# Patient Record
Sex: FEMALE | Race: WHITE | NOT HISPANIC OR LATINO | Employment: FULL TIME | URBAN - METROPOLITAN AREA
[De-identification: names, ages, dates, MRNs, and addresses within clinical notes are randomized per-mention and may not be internally consistent; named-entity substitution may affect disease eponyms.]

---

## 2019-03-21 ENCOUNTER — OFFICE VISIT (OUTPATIENT)
Dept: URGENT CARE | Facility: URGENT CARE | Age: 29
End: 2019-03-21
Payer: COMMERCIAL

## 2019-03-21 VITALS
TEMPERATURE: 97.4 F | HEART RATE: 71 BPM | OXYGEN SATURATION: 98 % | SYSTOLIC BLOOD PRESSURE: 122 MMHG | DIASTOLIC BLOOD PRESSURE: 60 MMHG

## 2019-03-21 DIAGNOSIS — R30.0 DYSURIA: ICD-10-CM

## 2019-03-21 DIAGNOSIS — R10.31 ABDOMINAL PAIN, RIGHT LOWER QUADRANT: Primary | ICD-10-CM

## 2019-03-21 LAB
ALBUMIN UR-MCNC: NEGATIVE MG/DL
APPEARANCE UR: CLEAR
BACTERIA #/AREA URNS HPF: ABNORMAL /HPF
BILIRUB UR QL STRIP: NEGATIVE
COLOR UR AUTO: YELLOW
GLUCOSE UR STRIP-MCNC: NEGATIVE MG/DL
HGB UR QL STRIP: NEGATIVE
KETONES UR STRIP-MCNC: NEGATIVE MG/DL
LEUKOCYTE ESTERASE UR QL STRIP: ABNORMAL
NITRATE UR QL: NEGATIVE
NON-SQ EPI CELLS #/AREA URNS LPF: ABNORMAL /LPF
PH UR STRIP: 6 PH (ref 5–7)
RBC #/AREA URNS AUTO: ABNORMAL /HPF
SOURCE: ABNORMAL
SP GR UR STRIP: <=1.005 (ref 1–1.03)
UROBILINOGEN UR STRIP-ACNC: 0.2 EU/DL (ref 0.2–1)
WBC #/AREA URNS AUTO: ABNORMAL /HPF

## 2019-03-21 PROCEDURE — 99203 OFFICE O/P NEW LOW 30 MIN: CPT | Performed by: FAMILY MEDICINE

## 2019-03-21 PROCEDURE — 81001 URINALYSIS AUTO W/SCOPE: CPT | Performed by: PHYSICIAN ASSISTANT

## 2019-03-21 RX ORDER — NORETHINDRONE ACETATE AND ETHINYL ESTRADIOL .02; 1 MG/1; MG/1
TABLET ORAL
Refills: 1 | COMMUNITY
Start: 2019-01-06 | End: 2021-07-29

## 2019-03-21 RX ORDER — SULFAMETHOXAZOLE/TRIMETHOPRIM 800-160 MG
1 TABLET ORAL 2 TIMES DAILY
Qty: 14 TABLET | Refills: 0 | Status: ON HOLD | OUTPATIENT
Start: 2019-03-21 | End: 2021-07-12

## 2019-03-21 RX ORDER — SULFAMETHOXAZOLE/TRIMETHOPRIM 800-160 MG
1 TABLET ORAL 2 TIMES DAILY
Qty: 14 TABLET | Refills: 0 | Status: SHIPPED | OUTPATIENT
Start: 2019-03-21 | End: 2019-03-21

## 2019-03-21 NOTE — PROGRESS NOTES
SUBJECTIVE  HPI:  Kary Lund is a 28 year old female who presents with the CC of abdominal/pelvic pain.    Pain is located in the RLQ area, with radiation to None.  The pain is characterized as sharp, and at worst is a level 8 on a scale of 1-10.  Pain has been present for 1 hour(s) and is slowly improving.  EXACERBATING FACTORS: movement/walking  RELIEVING FACTORS: nothing.  ASSOCIATED SX: bloating and none.    Family History   Problem Relation Age of Onset     Family History Negative Mother      Family History Negative Father        History reviewed. No pertinent past medical history.  Current Outpatient Medications   Medication Sig Dispense Refill     sulfamethoxazole-trimethoprim (BACTRIM DS/SEPTRA DS) 800-160 MG tablet Take 1 tablet by mouth 2 times daily 14 tablet 0     norethindrone-ethinyl estradiol (MICROGESTIN 1/20) 1-20 MG-MCG tablet TK 1 T PO  D  1     Social History     Tobacco Use     Smoking status: Not on file   Substance Use Topics     Alcohol use: Not on file       ROS:  10 point ROS of systems including Constitutional, Eyes, Respiratory, Cardiovascular,  Genitourinary, Integumentary, Muscularskeletal, Psychiatric were all negative except for pertinent positives noted in my HPI           OBJECTIVE:  /60 (BP Location: Right arm, Patient Position: Chair, Cuff Size: Adult Regular)   Pulse 71   Temp 97.4  F (36.3  C) (Tympanic)   SpO2 98%   GENERAL APPEARANCE: healthy, alert and no distress  EYES: EOMI,  PERRL, conjunctiva clear  HENT: ear canals and TM's normal.  Nose and mouth without ulcers, erythema or lesions  NECK: supple, nontender, no lymphadenopathy  RESP: lungs clear to auscultation - no rales, rhonchi or wheezes  CV: regular rates and rhythm, normal S1 S2, no murmur noted  ABDOMEN:  soft, RLQ tender, no HSM or masses and bowel sounds normal, no rigidity noted , no rebound tenderness   SKIN: no suspicious lesions or rashes      D/D   Appendicitis , ovarian cyst , UTI ,  musculoskeletal   ASSESSMENT:  Assessment     Kary was seen today for urgent care and abdominal pain.    Diagnoses and all orders for this visit:    Abdominal pain, right lower quadrant  -     UA reflex to Microscopic and Culture  -     Urine Microscopic    Dysuria  -     Discontinue: sulfamethoxazole-trimethoprim (BACTRIM DS/SEPTRA DS) 800-160 MG tablet; Take 1 tablet by mouth 2 times daily for 7 days  -     sulfamethoxazole-trimethoprim (BACTRIM DS/SEPTRA DS) 800-160 MG tablet; Take 1 tablet by mouth 2 times daily    discussed with pt that as pain is 2 out of 10   She is feeling much better , she can watch the symptoms   If pain gets worse again should follow up in the ER   She understood and agreed with the plan  Reviewed with pt if has any new worsening symptoms such as fever or chills should follow up in ER   She understood and agreed with the plan    Jessica Wiley MD           See Orders in Epic

## 2020-12-21 LAB
HBV SURFACE AG SERPL QL IA: NORMAL
HIV 1+2 AB+HIV1 P24 AG SERPL QL IA: NORMAL
RUBELLA ABY IGG: NORMAL

## 2021-03-30 LAB — GROUP B STREP PCR: NORMAL

## 2021-06-03 LAB — GROUP B STREP PCR: NORMAL

## 2021-07-09 ENCOUNTER — HOSPITAL ENCOUNTER (INPATIENT)
Facility: CLINIC | Age: 31
LOS: 5 days | Discharge: HOME HEALTH | End: 2021-07-14
Attending: ADVANCED PRACTICE MIDWIFE | Admitting: ADVANCED PRACTICE MIDWIFE
Payer: COMMERCIAL

## 2021-07-09 DIAGNOSIS — Z98.891 S/P PRIMARY LOW TRANSVERSE C-SECTION: Primary | ICD-10-CM

## 2021-07-09 LAB
ABO + RH BLD: NORMAL
ABO + RH BLD: NORMAL
BLD GP AB SCN SERPL QL: NORMAL
BLOOD BANK CMNT PATIENT-IMP: NORMAL
LABORATORY COMMENT REPORT: NORMAL
RUPTURE OF FETAL MEMBRANES BY ROM PLUS: NEGATIVE
SARS-COV-2 RNA RESP QL NAA+PROBE: NEGATIVE
SPECIMEN EXP DATE BLD: NORMAL
SPECIMEN SOURCE: NORMAL

## 2021-07-09 PROCEDURE — 86850 RBC ANTIBODY SCREEN: CPT | Performed by: ADVANCED PRACTICE MIDWIFE

## 2021-07-09 PROCEDURE — 87635 SARS-COV-2 COVID-19 AMP PRB: CPT | Performed by: ADVANCED PRACTICE MIDWIFE

## 2021-07-09 PROCEDURE — 250N000009 HC RX 250: Performed by: ADVANCED PRACTICE MIDWIFE

## 2021-07-09 PROCEDURE — 120N000002 HC R&B MED SURG/OB UMMC

## 2021-07-09 PROCEDURE — 84112 EVAL AMNIOTIC FLUID PROTEIN: CPT | Performed by: ADVANCED PRACTICE MIDWIFE

## 2021-07-09 PROCEDURE — 258N000003 HC RX IP 258 OP 636: Performed by: ADVANCED PRACTICE MIDWIFE

## 2021-07-09 PROCEDURE — 86780 TREPONEMA PALLIDUM: CPT | Performed by: ADVANCED PRACTICE MIDWIFE

## 2021-07-09 PROCEDURE — 86900 BLOOD TYPING SEROLOGIC ABO: CPT | Performed by: ADVANCED PRACTICE MIDWIFE

## 2021-07-09 PROCEDURE — 36415 COLL VENOUS BLD VENIPUNCTURE: CPT | Performed by: ADVANCED PRACTICE MIDWIFE

## 2021-07-09 PROCEDURE — 86901 BLOOD TYPING SEROLOGIC RH(D): CPT | Performed by: ADVANCED PRACTICE MIDWIFE

## 2021-07-09 RX ORDER — OXYTOCIN/0.9 % SODIUM CHLORIDE 30/500 ML
100-340 PLASTIC BAG, INJECTION (ML) INTRAVENOUS CONTINUOUS PRN
Status: COMPLETED | OUTPATIENT
Start: 2021-07-09 | End: 2021-07-12

## 2021-07-09 RX ORDER — OXYCODONE AND ACETAMINOPHEN 5; 325 MG/1; MG/1
1 TABLET ORAL
Status: DISCONTINUED | OUTPATIENT
Start: 2021-07-09 | End: 2021-07-12

## 2021-07-09 RX ORDER — NALOXONE HYDROCHLORIDE 0.4 MG/ML
0.2 INJECTION, SOLUTION INTRAMUSCULAR; INTRAVENOUS; SUBCUTANEOUS
Status: DISCONTINUED | OUTPATIENT
Start: 2021-07-09 | End: 2021-07-12

## 2021-07-09 RX ORDER — IBUPROFEN 800 MG/1
800 TABLET, FILM COATED ORAL
Status: DISCONTINUED | OUTPATIENT
Start: 2021-07-09 | End: 2021-07-12

## 2021-07-09 RX ORDER — SODIUM CHLORIDE, SODIUM LACTATE, POTASSIUM CHLORIDE, CALCIUM CHLORIDE 600; 310; 30; 20 MG/100ML; MG/100ML; MG/100ML; MG/100ML
INJECTION, SOLUTION INTRAVENOUS CONTINUOUS
Status: DISCONTINUED | OUTPATIENT
Start: 2021-07-09 | End: 2021-07-11

## 2021-07-09 RX ORDER — NALOXONE HYDROCHLORIDE 0.4 MG/ML
0.4 INJECTION, SOLUTION INTRAMUSCULAR; INTRAVENOUS; SUBCUTANEOUS
Status: DISCONTINUED | OUTPATIENT
Start: 2021-07-09 | End: 2021-07-12

## 2021-07-09 RX ORDER — ACETAMINOPHEN 325 MG/1
650 TABLET ORAL EVERY 4 HOURS PRN
Status: DISCONTINUED | OUTPATIENT
Start: 2021-07-09 | End: 2021-07-12

## 2021-07-09 RX ORDER — SODIUM CHLORIDE, SODIUM LACTATE, POTASSIUM CHLORIDE, CALCIUM CHLORIDE 600; 310; 30; 20 MG/100ML; MG/100ML; MG/100ML; MG/100ML
INJECTION, SOLUTION INTRAVENOUS CONTINUOUS
Status: DISCONTINUED | OUTPATIENT
Start: 2021-07-09 | End: 2021-07-12

## 2021-07-09 RX ORDER — OXYTOCIN/0.9 % SODIUM CHLORIDE 30/500 ML
1-24 PLASTIC BAG, INJECTION (ML) INTRAVENOUS CONTINUOUS
Status: DISCONTINUED | OUTPATIENT
Start: 2021-07-09 | End: 2021-07-12

## 2021-07-09 RX ORDER — MORPHINE SULFATE 10 MG/ML
10 INJECTION, SOLUTION INTRAMUSCULAR; INTRAVENOUS
Status: DISCONTINUED | OUTPATIENT
Start: 2021-07-09 | End: 2021-07-11

## 2021-07-09 RX ORDER — TRANEXAMIC ACID 10 MG/ML
1 INJECTION, SOLUTION INTRAVENOUS EVERY 30 MIN PRN
Status: DISCONTINUED | OUTPATIENT
Start: 2021-07-09 | End: 2021-07-12

## 2021-07-09 RX ORDER — OXYTOCIN 10 [USP'U]/ML
10 INJECTION, SOLUTION INTRAMUSCULAR; INTRAVENOUS
Status: DISCONTINUED | OUTPATIENT
Start: 2021-07-09 | End: 2021-07-12

## 2021-07-09 RX ORDER — METHYLERGONOVINE MALEATE 0.2 MG/ML
200 INJECTION INTRAVENOUS
Status: DISCONTINUED | OUTPATIENT
Start: 2021-07-09 | End: 2021-07-12

## 2021-07-09 RX ORDER — LIDOCAINE 40 MG/G
CREAM TOPICAL
Status: DISCONTINUED | OUTPATIENT
Start: 2021-07-09 | End: 2021-07-12

## 2021-07-09 RX ORDER — FENTANYL CITRATE 50 UG/ML
50-100 INJECTION, SOLUTION INTRAMUSCULAR; INTRAVENOUS
Status: DISCONTINUED | OUTPATIENT
Start: 2021-07-09 | End: 2021-07-12

## 2021-07-09 RX ORDER — ONDANSETRON 2 MG/ML
4 INJECTION INTRAMUSCULAR; INTRAVENOUS EVERY 6 HOURS PRN
Status: DISCONTINUED | OUTPATIENT
Start: 2021-07-09 | End: 2021-07-12

## 2021-07-09 RX ORDER — HYDROXYZINE HYDROCHLORIDE 25 MG/1
100 TABLET, FILM COATED ORAL
Status: DISCONTINUED | OUTPATIENT
Start: 2021-07-09 | End: 2021-07-12

## 2021-07-09 RX ORDER — CARBOPROST TROMETHAMINE 250 UG/ML
250 INJECTION, SOLUTION INTRAMUSCULAR
Status: DISCONTINUED | OUTPATIENT
Start: 2021-07-09 | End: 2021-07-12

## 2021-07-09 RX ADMIN — SODIUM CHLORIDE, POTASSIUM CHLORIDE, SODIUM LACTATE AND CALCIUM CHLORIDE: 600; 310; 30; 20 INJECTION, SOLUTION INTRAVENOUS at 18:10

## 2021-07-09 RX ADMIN — OXYTOCIN-SODIUM CHLORIDE 0.9% IV SOLN 30 UNIT/500ML 1 MILLI-UNITS/MIN: 30-0.9/5 SOLUTION at 18:10

## 2021-07-09 ASSESSMENT — MIFFLIN-ST. JEOR: SCORE: 1633.95

## 2021-07-09 NOTE — PLAN OF CARE
Data: Patient admitted to room 479 at 1335 Patient is a . Prenatal record reviewed.   OB History    Para Term  AB Living   1 0 0 0 0 0   SAB TAB Ectopic Multiple Live Births   0 0 0 0 0      # Outcome Date GA Lbr Clive/2nd Weight Sex Delivery Anes PTL Lv   1 Current            .  Medical History: History reviewed. No pertinent past medical history..  Gestational age Unknown. Vital signs per doc flowsheet. Fetal movement present. Patient reports Induction Of Labor   as reason for admission. Support persons is present.  Action: Report from Lindsay HERNANDEZ obtained at 1330. Care of patient assumed at 1335. Verbal consent for EFM, external fetal monitors applied. Admission assessment completed. Patient and support persons educated on labor process. Patient instructed to report change in fetal movement, contractions, vaginal leaking of fluid or bleeding, abdominal pain, or any concerns related to the pregnancy to her nurse/physician. Patient oriented to room, call light in reach.   Response: CNM Tigist Page informed of patient arrival.Plan per provider is as ordered. Patient verbalized understanding of education and verbalized agreement with plan. Patient coping with labor via family support.

## 2021-07-09 NOTE — H&P
ADMIT NOTE  =================  Unknown    Kary Lund is a 30 year old female with an No LMP recorded. Patient is pregnant. and Estimated Date of Delivery: Data Unavailable [facility] at 4:33 PM with for induction of labor.  Indication: BPP , oligo today.     HPI  ================    Contractions- mild  Fetal movement- active  ROM- no   Vaginal bleeding- spotting  GBS- negative  FOB- is involved, Robert  Other labor support-     Weight gain-  -  lbs, Total weight gain-  lbs  Height-   BMI-   First prenatal visit at  weeks, Total visits-     PROBLEM LIST  =================  Patient Active Problem List    Diagnosis Date Noted     Indication for care in labor or delivery 2021     Priority: Medium       HISTORIES  ============  Allergies   Allergen Reactions     Clindamycin/Lincomycin Rash     Amoxicillin      History reviewed. No pertinent past medical history.  History reviewed. No pertinent surgical history..  Family History   Problem Relation Age of Onset     Family History Negative Mother      Family History Negative Father      Social History     Tobacco Use     Smoking status: Never Smoker     Smokeless tobacco: Never Used   Substance Use Topics     Alcohol use: Not Currently     OB History    Para Term  AB Living   1 0 0 0 0 0   SAB TAB Ectopic Multiple Live Births   0 0 0 0 0      # Outcome Date GA Lbr Clive/2nd Weight Sex Delivery Anes PTL Lv   1 Current                 LABS:   ===========  Prenatal Labs:  Rhogam not indicated No results found for: ABO, RH, AS, HEPBANG, TREPAB, RUBELLAABIGG, HGB, HIV  Rubella immune  No results found for: GBS  Other labs:  No results found for this or any previous visit (from the past 24 hour(s)).    ROS  =========  Pt denies significant respiratory, cardiovacular, GI, or muscular/skeletalcomplaints.    See RN data base ROS.       PHYSICAL EXAM:  ===============  Temp 98.3  F (36.8  C) (Oral)   Ht 1.626 m (5'  "4\")   Wt 93.9 kg (207 lb)   Breastfeeding No   BMI 35.53 kg/m    General appearance: comfortable  GENERAL APPEARANCE: healthy, alert and no distress  RESP: lungs clear to auscultation - no rales, rhonchi or wheezes  CV: regular rates and rhythm, normal S1 S2, no S3 or S4 and no murmur,and no varicosities  ABDOMEN:  soft, nontender, no epigastric pain  SKIN: no suspicious lesions or rashes  NEURO: Denies headache, blurred vision, other vision changes  PSYCH: mentation appears normal. and affect normal/bright  Legs: Reflexes normal bilaterally     Abdomen: gravid, vertex fetus per Leopold's, non-tender between contractions.   Cephalic presentation confirmed by BSUS  EFW-  8 lbs.   CONTACTIONS: every 6 minutes  FETAL HEART TONES: continuous EFM- baseline 135 with moderate variability and positive accelerations. No decelerations.  PELVIC EXAM: 2/ 70%/ Mid/ soft/ 0   BULLOCK SCORE: 8  BLOODY SHOW: yes small amount   ROM:no  FLUID: none  AMNISURE: negative  Negative pool and fern slide  ASSESSMENT:  ==============  IUP @ Unknown admitted for induction of labor.  Indication: postdates and oligo   NST REACTIVE  Fetal Heart Rate - category one  GBS- negative    Patient Active Problem List   Diagnosis     Indication for care in labor or delivery        PLAN:  ===========  Admit - see IP orders  Labor induction with Pitocin reviewed with pt. Agreeable to plan  Ambulation, hydration, position changes, birthing ball and tub options to facilitate labor reviewed with pt .  Tigist De Jesus, APRN CNM      "

## 2021-07-10 PROBLEM — O41.03X0 OLIGOHYDRAMNIOS IN THIRD TRIMESTER: Status: ACTIVE | Noted: 2021-07-10

## 2021-07-10 LAB — T PALLIDUM AB SER QL: NONREACTIVE

## 2021-07-10 PROCEDURE — 258N000003 HC RX IP 258 OP 636: Performed by: ADVANCED PRACTICE MIDWIFE

## 2021-07-10 PROCEDURE — 250N000013 HC RX MED GY IP 250 OP 250 PS 637: Performed by: ADVANCED PRACTICE MIDWIFE

## 2021-07-10 PROCEDURE — 250N000011 HC RX IP 250 OP 636: Performed by: ADVANCED PRACTICE MIDWIFE

## 2021-07-10 PROCEDURE — 120N000002 HC R&B MED SURG/OB UMMC

## 2021-07-10 RX ORDER — ACETAMINOPHEN 325 MG/1
650 TABLET ORAL ONCE
Status: COMPLETED | OUTPATIENT
Start: 2021-07-10 | End: 2021-07-10

## 2021-07-10 RX ORDER — HYDROXYZINE HYDROCHLORIDE 50 MG/1
100 TABLET, FILM COATED ORAL
Status: DISCONTINUED | OUTPATIENT
Start: 2021-07-10 | End: 2021-07-11

## 2021-07-10 RX ORDER — MORPHINE SULFATE 10 MG/ML
10 INJECTION, SOLUTION INTRAMUSCULAR; INTRAVENOUS
Status: DISCONTINUED | OUTPATIENT
Start: 2021-07-10 | End: 2021-07-12

## 2021-07-10 RX ORDER — MISOPROSTOL 200 UG/1
TABLET ORAL
Status: DISCONTINUED
Start: 2021-07-10 | End: 2021-07-10 | Stop reason: HOSPADM

## 2021-07-10 RX ADMIN — MORPHINE SULFATE 10 MG: 10 INJECTION INTRAVENOUS at 23:51

## 2021-07-10 RX ADMIN — SODIUM CHLORIDE, POTASSIUM CHLORIDE, SODIUM LACTATE AND CALCIUM CHLORIDE: 600; 310; 30; 20 INJECTION, SOLUTION INTRAVENOUS at 01:54

## 2021-07-10 RX ADMIN — SODIUM CHLORIDE, POTASSIUM CHLORIDE, SODIUM LACTATE AND CALCIUM CHLORIDE: 600; 310; 30; 20 INJECTION, SOLUTION INTRAVENOUS at 09:56

## 2021-07-10 RX ADMIN — HYDROXYZINE HYDROCHLORIDE 100 MG: 50 TABLET, FILM COATED ORAL at 23:45

## 2021-07-10 RX ADMIN — ACETAMINOPHEN 650 MG: 325 TABLET, FILM COATED ORAL at 05:51

## 2021-07-10 NOTE — PLAN OF CARE
Care assumed by this writer [id number]. Seema is comfortable and chatty, watching TV with partner.  States contractions are like menstrual cramps, talking through.  Discussed labor preferences/expectations. Plan to use position changes, breathing, and rhythmic movements to cope. Wants to avoid epidural anesthesia.  Contractions 1-4 minutes apart and coupling, some are stronger than others.  FHR baseline 140, moderate variability with accelerations.   Declines morphine and vistaril for sleep at this time.     Plan to continue titration of Pitocin per protocol, encourage alternation of rest and upright positioning and provide bedside labor support.

## 2021-07-10 NOTE — PROVIDER NOTIFICATION
07/10/21 0945   Provider Notification   Provider Name/Title Tigist De Jesus, DELICIA, MARY & Chichi (SNM)   Method of Notification At Bedside   Request Evaluate in Person   Notification Reason Status Update;SVE   Providers here to meet patient, review EFM strip and check cervix. 3/90/0. Providers recommend AROM. Patient and spouse are hesitant and want to think about it for now. EFM strip normal baseline with moderate variability.

## 2021-07-10 NOTE — PLAN OF CARE
Seema is coping well, noticing she cannot lie down through contractions anymore.  Bloody show present.  Sitting on edge of bed, rocking and using hot packs. Appears relaxed between contractions.  Offered continuous bedside support, family declines at this time. They feel they are doing well on their own and know they can call with questions or for support at any time.

## 2021-07-10 NOTE — PROGRESS NOTES
"Subjective:  Kary is comfortably ambulating around room and breathing through contractions. Reports feeling well overall with increased pelvic pressure with contractions.    Objective:  Blood pressure 114/66, pulse 70, temperature 98  F (36.7  C), temperature source Oral, resp. rate 16, height 1.626 m (5' 4\"), weight 93.9 kg (207 lb), last menstrual period 09/25/2020, not currently breastfeeding.  Patient Vitals for the past 24 hrs:   BP Temp Temp src Pulse Resp Height Weight   07/10/21 0630 114/66 98  F (36.7  C) Oral -- 16 -- --   07/10/21 0440 111/68 98.5  F (36.9  C) Oral -- 16 -- --   07/10/21 0232 117/60 97.9  F (36.6  C) Oral -- 16 -- --   07/10/21 0024 107/59 98  F (36.7  C) Oral -- 16 -- --   07/09/21 2330 -- -- -- 70 16 -- --   07/09/21 2145 120/70 -- -- 76 -- -- --   07/09/21 2045 102/51 -- -- 71 -- -- --   07/09/21 1810 108/64 -- -- 76 -- -- --   07/09/21 1350 -- 98.3  F (36.8  C) Oral -- -- 1.626 m (5' 4\") 93.9 kg (207 lb)     General appearance: uncomfortable with contractions  CONTACTIONS: every 1-4 minutes  Pitocin- 8 mu/min.,  Antibiotics- none  FETAL HEART TONES: continuous EFM- baseline 125 with moderate variability and positive accelerations. No decelerations.  ROM: not ruptured  PELVIC EXAM:3/ 90%/ Mid/ soft/ 0   # Pain Assessment:  Current Pain Score 7/10/2021   Patient currently in pain? yes   - Kary is experiencing pain due to labor. Pain management was discussed with Kary and her spouse and the plan was created in a collaborative fashion.  Kary's response to the current recommendations: engaged  - Non-pharmacologic adjuvants:  movement, positioning, breathing techniques      ASSESSMENT:  ==============  IUP @ 41w1d for induction of labor.  Indication: Oligohydramnios   Fetal Heart Rate Tracing category one  GBS- negative  Patient Active Problem List   Diagnosis    Indication for care in labor or delivery    Oligohydramnios in third trimester "     PLAN:  ===========  Ambulation, hydration, position changes, birthing ball/sling and tub options to facilitate labor.  Reevaluate in 1-2 hours prn  Continue labor induction with Pitocin   AROM offered. Pt prefers to discuss with  prior to decision.    I, MADISON Camp, am serving as a scribe; to document services personally performed by  Tigist De Jesus CNM based on data collection and the provider's statements to me.     MADISON Camp  I agree with the PFSH and ROS as completed by the student, except for changes made by me. The remainder of the encounter was performed by me and scribed by the student. The scribed note accurately reflects my personal services and decisions made by me.  Tigist De Jesus, ADRIANE, EZEKIELM, APRN

## 2021-07-10 NOTE — PROGRESS NOTES
"CNM PROGRESS NOTE    SUBJECTIVE:  Patient sitting in bed, comfortable. Partner is bedside and supportive. Reports feeling occasional mild cramps.     OBJECTIVE:  Temp 98.3  F (36.8  C) (Oral)   Ht 1.626 m (5' 4\")   Wt 93.9 kg (207 lb)   LMP 09/25/2020 (LMP Unknown)   Breastfeeding No   BMI 35.53 kg/m      Fetal heart tones: Baseline 135   Variability: moderate  Accelerations: present  Decelerations: absent    Contractions: Pt is guerita every 3-5 minutes, lasting 60-90 seconds   Cervix: deferred  ROM: not ruptured    Pitocin- 2 mu/min.  Antibiotics- none  Cervical ripening: N/A    ASSESSMENT:  IUP @ Unknown IOL for oligohydramnios, later term gestation, and BPP 4/8  GBS- negative  Covid neg    PLAN:   Will continue with pitocin and titrate as able. Reviewed ROM may occur spontaneously or recommendation of AROM with a future SVE.   Reviewed options of morphine and vistaril for therapeutic sleep. Will order medications PRN and patient can ask RN for administration as desired.   reevaluate in 2-4 hours/PRN    Sneha Nelson CNM      "

## 2021-07-10 NOTE — PLAN OF CARE
Ambulating in room with partner, using birth-ball and different labor positions, coping well with labor. See flow sheet for FHR and contraction pattern.  1:1 RN care provided .Will continue to monitor and will notify provider if there is a change in status. Anticipate .

## 2021-07-10 NOTE — PROGRESS NOTES
"Subjective:  Kary is resting between contractions and laboring on cub. Reports increased sensation with contractions. Breathing through contractions. Planning to eat lunch soon.    Objective:  Blood pressure 116/62, pulse 77, temperature 98.3  F (36.8  C), temperature source Oral, resp. rate 16, height 1.626 m (5' 4\"), weight 93.9 kg (207 lb), last menstrual period 09/25/2020, not currently breastfeeding.    Patient Vitals for the past 24 hrs:   BP Temp Temp src Pulse Resp   07/10/21 1310 116/62 98.3  F (36.8  C) Oral -- 16   07/10/21 1110 116/66 98.5  F (36.9  C) Oral 77 16   07/10/21 0835 115/71 98.2  F (36.8  C) Oral -- 16   07/10/21 0630 114/66 98  F (36.7  C) Oral -- 16   07/10/21 0440 111/68 98.5  F (36.9  C) Oral -- 16   07/10/21 0232 117/60 97.9  F (36.6  C) Oral -- 16   07/10/21 0024 107/59 98  F (36.7  C) Oral -- 16   07/09/21 2330 -- -- -- 70 16   07/09/21 2145 120/70 -- -- 76 --   07/09/21 2045 102/51 -- -- 71 --   07/09/21 1810 108/64 -- -- 76 --     General appearance: uncomfortable with contractions  CONTACTIONS: every 1-4 minutes  Pitocin- 16 mu/min.,  Antibiotics- none  FETAL HEART TONES: continuous EFM- baseline 120 with moderate variability and positive accelerations. No decelerations.  ROM: not ruptured  PELVIC EXAM:deferred  # Pain Assessment:  Current Pain Score 7/10/2021   Patient currently in pain? yes   - Kary is experiencing pain due to labor. Pain management was discussed with Kary and her spouse and the plan was created in a collaborative fashion.  Kary's response to the current recommendations: engaged  - Non-pharmacologic adjuvants: Meditation and positioning      ASSESSMENT:  ==============  IUP @ 41w1d in early labor and for induction of labor.  Indication: Oligohydramnios   Fetal Heart Rate Tracing category one  GBS- negative  Membranes intact    Patient Active Problem List   Diagnosis    Indication for care in labor or delivery    Oligohydramnios in third trimester "     PLAN:  ===========  Ambulation, hydration, position changes, birthing ball/sling and tub options to facilitate labor.  Discussed risk/benefits of AROM vs. [place] vs. Observatoin. Pt electing for observation given increasing intensity of contractions.   Reevaluate in 1-2 hours prn  Continue labor induction with Pitocin     I, MADISON Camp, am serving as a scribe; to document services personally performed by  Tigist De Jesus CNM based on data collection and the provider's statements to me.     MADISON Camp  I agree with the PFSH and ROS as completed by the student, except for changes made by me. The remainder of the encounter was performed by me and scribed by the student. The scribed note accurately reflects my personal services and decisions made by me.  Tigist De Jesus, ADRIANE, MARY, APRN

## 2021-07-10 NOTE — PLAN OF CARE
VSS, afebrile. Intact.  Coping well with contractions, sitting/walking, swaying, breathing well, using birth ball. Inwardly focused. States she likely will not want to be touched as things progress, likes uninterrupted focus.  Tylenol given for mild headache this morning.  Pitocin @ 8 mU/hr. 3-5 ctx in 10 minutes.   Last cervical exam @ 1530 2/70%/0, declined exam this morning.   FHR baseline 120, moderate variability with accelerations.      Continue plan of care

## 2021-07-10 NOTE — PROGRESS NOTES
"Subjective:  Kary remains active while laboring. Sitting on birthing ball and leaning against bed. Requesting leopolds maneuvers prior to deciding on AROM due to concern for fetal positioning per guidance of anjana. Discussed that this does not provide conclusive evidence of fetal head positioning, pt expressing understanding.     Objective:  Blood pressure 116/66, pulse 77, temperature 98.5  F (36.9  C), temperature source Oral, resp. rate 16, height 1.626 m (5' 4\"), weight 93.9 kg (207 lb), last menstrual period 09/25/2020, not currently breastfeeding.  Patient Vitals for the past 24 hrs:   BP Temp Temp src Pulse Resp Height Weight   07/10/21 1110 116/66 98.5  F (36.9  C) Oral 77 16 -- --   07/10/21 0835 115/71 98.2  F (36.8  C) Oral -- 16 -- --   07/10/21 0630 114/66 98  F (36.7  C) Oral -- 16 -- --   07/10/21 0440 111/68 98.5  F (36.9  C) Oral -- 16 -- --   07/10/21 0232 117/60 97.9  F (36.6  C) Oral -- 16 -- --   07/10/21 0024 107/59 98  F (36.7  C) Oral -- 16 -- --   07/09/21 2330 -- -- -- 70 16 -- --   07/09/21 2145 120/70 -- -- 76 -- -- --   07/09/21 2045 102/51 -- -- 71 -- -- --   07/09/21 1810 108/64 -- -- 76 -- -- --   07/09/21 1350 -- 98.3  F (36.8  C) Oral -- -- 1.626 m (5' 4\") 93.9 kg (207 lb)     General appearance: uncomfortable with contractions  CONTACTIONS: every 2-5 minutes  Pitocin- 11 mu/min.,  Antibiotics- none  FETAL HEART TONES: continuous EFM- baseline 125 with moderate variability and positive accelerations. No decelerations.  ROM: not ruptured per patient request  PELVIC EXAM:deferred    # Pain Assessment:  Current Pain Score 7/10/2021   Patient currently in pain? yes   - Kary is experiencing pain due to labor. Pain management was discussed with Kary and her spouse and the plan was created in a collaborative fashion.  Kary's response to the current recommendations: engaged  - Non-pharmacologic adjuvants: Meditation and movement      ASSESSMENT:  ==============  IUP @ 41w1d " in early labor and for induction of labor.  Indication: Oligohydramnios   Fetal Heart Rate Tracing category one  GBS- negative  Fetus in right longitudinal position.    Patient Active Problem List   Diagnosis    Indication for care in labor or delivery    Oligohydramnios in third trimester     PLAN:  ===========  Ambulation, hydration, position changes, birthing ball/sling and tub options to facilitate labor.  Pt would like to continue to consider AROM vs. ulloa bulb vs. Observatoin.   Labor induction with Pitocin reviewed with pt. Agreeable to plan.   Reevaluate in 1-2 hours prn    I, MADISON Camp, am serving as a scribe; to document services personally performed by  Tigist De Jesus CNM based on data collection and the provider's statements to me.     MADISON Camp  I agree with the PFSH and ROS as completed by the student, except for changes made by me. The remainder of the encounter was performed by me and scribed by the student. The scribed note accurately reflects my personal services and decisions made by me.  Tigist De Jesus, ADRIANE, EZEKIELM, APRN

## 2021-07-10 NOTE — PROGRESS NOTES
"CNM PROGRESS NOTE    SUBJECTIVE:  Stopped in to check on patient. She is awake, but resting in bed between contractions. Reports feeling a change in pain when pitocin went from 5-6mu. She did get about 2 hours of sleep earlier, but states she cannot sleep through contractions now. Endorses bloody show and mucous. States she is coping well. Does note she has a mild headache. No vision changes or RUQ pain. Robert is bedside and supportive. Agreeable to plan of care discussion    OBJECTIVE:  /68   Pulse 70   Temp 98.5  F (36.9  C) (Oral)   Resp 16   Ht 1.626 m (5' 4\")   Wt 93.9 kg (207 lb)   LMP 09/25/2020   Breastfeeding No   BMI 35.53 kg/m      Fetal heart tones: Baseline 120   Variability: moderate  Accelerations: present  Decelerations: absent    Contractions: Pt is guerita every 2-4 minutes, lasting 60-80 seconds   Cervix: deferred  ROM: not ruptured    Pitocin- 7 mu/min.  Antibiotics- none  Cervical ripening: N/A    ASSESSMENT:  IUP @ 41w1d IOL for oligohydramnios; late term gestation; BPP 4/8  GBS- negative  Covid negative     PLAN:   Reviewed pitocin has been running for ~12 hours and if patient desires, SVE could be performed. Discussed that perception of increased pain and bloody show also indicate that pitocin is likely working to advance labor. Patient wishes to defer SVE at this time. Discussed that if not feeling more uncomfortable in 4 hours and pitocin is unable to titrate higher, would recommend SVE to assess IOL progress. Patient is agreeable.  Tylenol for headache.  reevaluate in 2-4 hours/PRN    Sneha Nelson CNM      "

## 2021-07-10 NOTE — PROVIDER NOTIFICATION
07/10/21 0040   Provider Notification   Provider Name/Title Sneha Nelson CNM   Method of Notification Electronic Page     Seema is declining sleep meds and requesting to increase Pit 1U/hr at a time so she can rest for a couple hours. Just wanted to let you know and make sure you were ok with this plan.

## 2021-07-10 NOTE — PLAN OF CARE
Patient VSS, afebrile, coping with labor well using birthing ball, cubby, support person. FHR with normal baseline and moderate variability. Contractions irregular with some coupling and tripling throughout the day. Patient able to rest intermittently between contractions. Patient also is in touch with her  over the phone. No leaking or bleeding.   Continuous monitoring per moreno monitor. Expectant management.

## 2021-07-10 NOTE — PROGRESS NOTES
"CNM PROGRESS NOTE    SUBJECTIVE:  Received page from RN with update that Kary is requesting to increase pitocin by 1mu/hr so as to help her rest overnight. She has declined therapeutic rest medications as she generally doesn't take anything for sleep and is worried it will make her feel groggy.     OBJECTIVE:  /59   Pulse 70   Temp 98  F (36.7  C) (Oral)   Resp 16   Ht 1.626 m (5' 4\")   Wt 93.9 kg (207 lb)   LMP 09/25/2020   Breastfeeding No   BMI 35.53 kg/m      Fetal heart tones: Baseline 135   Variability: moderate  Accelerations: present  Decelerations: absent    Contractions: Pt is guerita every 2-4 minutes, lasting 60-80 seconds   Cervix: deferred  ROM: not ruptured    Pitocin- 5 mu/min.  Antibiotics- none  Cervical ripening: N/A    ASSESSMENT:  IUP @ 41w1d IOL for oligohydramnios   Late term gestation; BPP 4/8  GBS- negative  Covid negative     PLAN:   Can accommodate patient wishes and will proceed with slower pitocin titration at this time.   Therapeutic rest meds available PRN  reevaluate in 2-4 hours/PRN    Sneha Nelson CNM      "

## 2021-07-10 NOTE — PROVIDER NOTIFICATION
07/10/21 1245   Provider Notification   Provider Name/Title Tigist Page DELICIA, MARY & Chichi ([facility])   Method of Notification At Bedside   Request Evaluate in Person   Notification Reason Status Update   Providers here to perform Leopold's maneuvers to help determine position of the baby. Patient declining AROM at this time. She is practicing frequent position changes and allowing Oxytocin to be increased every 1-2 hours. FHR normal baseline with moderate variability. Maternal VSS. Support person present.  will arrive at some point.

## 2021-07-10 NOTE — PROGRESS NOTES
"Subjective:  Kary laboring on Cub. Inwardly focused with contractions. Utilizing controlled breathing and visualization to cope. Reports increased intensity and pressure with contractions. Was able to take 45 minute nap this afternoon which she states really helped her with her energy. States that anjana Montana is coming in now. Planning to do some movement and positioning with her before wanting next check.    Objective:  Blood pressure 129/71, pulse 77, temperature 98.4  F (36.9  C), temperature source Oral, resp. rate 18, height 1.626 m (5' 4\"), weight 93.9 kg (207 lb), last menstrual period 09/25/2020, not currently breastfeeding.    Patient Vitals for the past 24 hrs:   BP Temp Temp src Pulse Resp   07/10/21 1600 129/71 98.4  F (36.9  C) Oral -- 18   07/10/21 1505 115/61 98.2  F (36.8  C) Oral -- 16   07/10/21 1310 116/62 98.3  F (36.8  C) Oral -- 16   07/10/21 1110 116/66 98.5  F (36.9  C) Oral 77 16   07/10/21 0835 115/71 98.2  F (36.8  C) Oral -- 16   07/10/21 0630 114/66 98  F (36.7  C) Oral -- 16   07/10/21 0440 111/68 98.5  F (36.9  C) Oral -- 16   07/10/21 0232 117/60 97.9  F (36.6  C) Oral -- 16   07/10/21 0024 107/59 98  F (36.7  C) Oral -- 16   07/09/21 2330 -- -- -- 70 16   07/09/21 2145 120/70 -- -- 76 --   07/09/21 2045 102/51 -- -- 71 --   07/09/21 1810 108/64 -- -- 76 --     General appearance: uncomfortable with contractions  CONTACTIONS: every 1-7 minutes  Pitocin- 17 mu/min.,  Antibiotics- none  FETAL HEART TONES: continuous EFM- baseline 135 with moderate variability and positive accelerations. No decelerations.  ROM: not ruptured  PELVIC EXAM:deferred  # Pain Assessment:  Current Pain Score 7/10/2021   Patient currently in pain? yes   - Kary is experiencing pain due to labor. Pain management was discussed with Kary and her spouse and the plan was created in a collaborative fashion.  Kary's response to the current recommendations: engaged  - Non-pharmacologic adjuvants: " Meditation, visualization, positioning, movement    ASSESSMENT:  ==============  IUP @ 41w1d in early labor and for induction of labor. Indication: Oligohydramnios   Fetal Heart Rate Tracing category one  GBS- negative  Membranes intact    Patient Active Problem List   Diagnosis    Indication for care in labor or delivery    Oligohydramnios in third trimester     PLAN:  ===========  Ambulation, hydration, position changes, birthing ball/sling and tub options to facilitate labor.  Continue labor induction with Pitocin   Reviewed risks/benefits of observation vs. AROM vs. [place]. Pt electing for observation until time with anjana Montana.   Reevaluate in 1-2 hours prn    I, MADISON Camp, am serving as a scribe; to document services personally performed by  Tigist De Jesus CNM based on data collection and the provider's statements to me.     MADISON Camp  I agree with the PFSH and ROS as completed by the student, except for changes made by me. The remainder of the encounter was performed by me and scribed by the student. The scribed note accurately reflects my personal services and decisions made by me.  Tigist De Jesus, ADRIANE, MARY, APRN

## 2021-07-11 ENCOUNTER — ANESTHESIA EVENT (OUTPATIENT)
Dept: OBGYN | Facility: CLINIC | Age: 31
End: 2021-07-11
Payer: COMMERCIAL

## 2021-07-11 ENCOUNTER — ANESTHESIA (OUTPATIENT)
Dept: OBGYN | Facility: CLINIC | Age: 31
End: 2021-07-11
Payer: COMMERCIAL

## 2021-07-11 PROBLEM — O48.0 41 WEEKS GESTATION OF PREGNANCY: Status: ACTIVE | Noted: 2021-07-11

## 2021-07-11 PROBLEM — Z3A.41 41 WEEKS GESTATION OF PREGNANCY: Status: ACTIVE | Noted: 2021-07-11

## 2021-07-11 LAB
ERYTHROCYTE [DISTWIDTH] IN BLOOD BY AUTOMATED COUNT: 12.7 % (ref 10–15)
HCT VFR BLD AUTO: 34.8 % (ref 35–47)
HGB BLD-MCNC: 11.9 G/DL (ref 11.7–15.7)
HOLD SPECIMEN: NORMAL
HOLD SPECIMEN: NORMAL
MCH RBC QN AUTO: 30.7 PG (ref 26.5–33)
MCHC RBC AUTO-ENTMCNC: 34.2 G/DL (ref 31.5–36.5)
MCV RBC AUTO: 90 FL (ref 78–100)
PLATELET # BLD AUTO: 185 10E3/UL (ref 150–450)
RBC # BLD AUTO: 3.87 10E6/UL (ref 3.8–5.2)
WBC # BLD AUTO: 20.1 10E3/UL (ref 4–11)

## 2021-07-11 PROCEDURE — 82565 ASSAY OF CREATININE: CPT | Performed by: OBSTETRICS & GYNECOLOGY

## 2021-07-11 PROCEDURE — 99207 PR NO BILLABLE SERVICE THIS VISIT: CPT | Performed by: OBSTETRICS & GYNECOLOGY

## 2021-07-11 PROCEDURE — 258N000003 HC RX IP 258 OP 636: Performed by: ADVANCED PRACTICE MIDWIFE

## 2021-07-11 PROCEDURE — 250N000011 HC RX IP 250 OP 636

## 2021-07-11 PROCEDURE — 85018 HEMOGLOBIN: CPT | Performed by: ADVANCED PRACTICE MIDWIFE

## 2021-07-11 PROCEDURE — 250N000013 HC RX MED GY IP 250 OP 250 PS 637: Performed by: ADVANCED PRACTICE MIDWIFE

## 2021-07-11 PROCEDURE — 250N000011 HC RX IP 250 OP 636: Performed by: ANESTHESIOLOGY

## 2021-07-11 PROCEDURE — 36415 COLL VENOUS BLD VENIPUNCTURE: CPT | Performed by: ADVANCED PRACTICE MIDWIFE

## 2021-07-11 PROCEDURE — 250N000009 HC RX 250: Performed by: ANESTHESIOLOGY

## 2021-07-11 PROCEDURE — 120N000002 HC R&B MED SURG/OB UMMC

## 2021-07-11 PROCEDURE — 250N000011 HC RX IP 250 OP 636: Performed by: ADVANCED PRACTICE MIDWIFE

## 2021-07-11 PROCEDURE — 250N000009 HC RX 250

## 2021-07-11 RX ORDER — CALCIUM CARBONATE 500 MG/1
1000 TABLET, CHEWABLE ORAL DAILY PRN
Status: DISCONTINUED | OUTPATIENT
Start: 2021-07-11 | End: 2021-07-12

## 2021-07-11 RX ORDER — FENTANYL/BUPIVACAINE/NS/PF 2-1250MCG
PLASTIC BAG, INJECTION (ML) INJECTION CONTINUOUS PRN
Status: DISCONTINUED | OUTPATIENT
Start: 2021-07-11 | End: 2021-07-12

## 2021-07-11 RX ORDER — LIDOCAINE HYDROCHLORIDE AND EPINEPHRINE 15; 5 MG/ML; UG/ML
INJECTION, SOLUTION EPIDURAL PRN
Status: DISCONTINUED | OUTPATIENT
Start: 2021-07-11 | End: 2021-07-12

## 2021-07-11 RX ORDER — FENTANYL/BUPIVACAINE/NS/PF 2-1250MCG
PLASTIC BAG, INJECTION (ML) INJECTION
Status: COMPLETED
Start: 2021-07-11 | End: 2021-07-11

## 2021-07-11 RX ORDER — FENTANYL CITRATE-0.9 % NACL/PF 10 MCG/ML
100 PLASTIC BAG, INJECTION (ML) INTRAVENOUS EVERY 5 MIN PRN
Status: DISCONTINUED | OUTPATIENT
Start: 2021-07-11 | End: 2021-07-12 | Stop reason: HOSPADM

## 2021-07-11 RX ORDER — EPHEDRINE SULFATE 50 MG/ML
INJECTION, SOLUTION INTRAMUSCULAR; INTRAVENOUS; SUBCUTANEOUS
Status: COMPLETED
Start: 2021-07-11 | End: 2021-07-11

## 2021-07-11 RX ORDER — NALBUPHINE HYDROCHLORIDE 10 MG/ML
2.5-5 INJECTION, SOLUTION INTRAMUSCULAR; INTRAVENOUS; SUBCUTANEOUS EVERY 6 HOURS PRN
Status: DISCONTINUED | OUTPATIENT
Start: 2021-07-11 | End: 2021-07-12

## 2021-07-11 RX ADMIN — ANTACID TABLETS 1000 MG: 500 TABLET, CHEWABLE ORAL at 22:54

## 2021-07-11 RX ADMIN — SODIUM CHLORIDE, POTASSIUM CHLORIDE, SODIUM LACTATE AND CALCIUM CHLORIDE: 600; 310; 30; 20 INJECTION, SOLUTION INTRAVENOUS at 09:35

## 2021-07-11 RX ADMIN — ANTACID TABLETS 1000 MG: 500 TABLET, CHEWABLE ORAL at 20:46

## 2021-07-11 RX ADMIN — LIDOCAINE HYDROCHLORIDE,EPINEPHRINE BITARTRATE 3 ML: 15; .005 INJECTION, SOLUTION EPIDURAL; INFILTRATION; INTRACAUDAL; PERINEURAL at 15:50

## 2021-07-11 RX ADMIN — Medication 1 ML/HR: at 16:10

## 2021-07-11 RX ADMIN — FENTANYL CITRATE 100 MCG: 50 INJECTION INTRAMUSCULAR; INTRAVENOUS at 13:49

## 2021-07-11 RX ADMIN — Medication 10 ML/HR: at 15:50

## 2021-07-11 RX ADMIN — Medication 5 MG: at 16:20

## 2021-07-11 RX ADMIN — ONDANSETRON 4 MG: 2 INJECTION INTRAMUSCULAR; INTRAVENOUS at 08:49

## 2021-07-11 RX ADMIN — SODIUM CHLORIDE, POTASSIUM CHLORIDE, SODIUM LACTATE AND CALCIUM CHLORIDE 125 ML/HR: 600; 310; 30; 20 INJECTION, SOLUTION INTRAVENOUS at 15:30

## 2021-07-11 RX ADMIN — SODIUM CHLORIDE, POTASSIUM CHLORIDE, SODIUM LACTATE AND CALCIUM CHLORIDE: 600; 310; 30; 20 INJECTION, SOLUTION INTRAVENOUS at 22:15

## 2021-07-11 RX ADMIN — SODIUM CHLORIDE, POTASSIUM CHLORIDE, SODIUM LACTATE AND CALCIUM CHLORIDE: 600; 310; 30; 20 INJECTION, SOLUTION INTRAVENOUS at 01:40

## 2021-07-11 NOTE — PROGRESS NOTES
"Blood pressure 109/67, pulse 66, temperature 98.5  F (36.9  C), temperature source Oral, resp. rate 18, height 1.626 m (5' 4\"), weight 93.9 kg (207 lb), last menstrual period 2020, SpO2 99 %, unknown if currently breastfeeding.  General appearance: comfortable  Epidural effective    CONTRACTIONS: Contractions every 1-8  minutes.  Palpate: moderate  Pitocin- 22 mu/min.,  Antibiotics- none  FETAL HEART TONES: baseline 130 with moderate FHR variability and  pos accelerations.      ROM: clear fluid  PELVIC EXAM:PELVIC EXAM: 9/ 100%/ Mid/ soft/ 0         ASSESSMENT:  ==============  IUP @ 41w2d active labor, good progress and IOL for oligohydramnios   Fetal Heart rate tracing  category two  GBS- negative           PLAN:  ===========  Anticipate   Labor induction with Pitocin  reevaluate in 2hours/PRN   Watch FHT closely      Tigist De Jesus APRN CNM,EZEKIELM, APRN      "

## 2021-07-11 NOTE — PLAN OF CARE
The EMR was down for 5 hours on 7/11/2021.    Aria Burnett was responsible for completing the paper charting during this time period.     The following information was re-entered into the system by Aria Burnett RN: Last set of vital signs, Flowsheet data, Intake and output and MAR    The following information will remain in the paper chart: Uterine activity, Fetal assessment, previous vital signs and maternal assessment, interventions/ comfort measures, and progress notes.    Aria Burnett RN  7/11/2021

## 2021-07-11 NOTE — ANESTHESIA PROCEDURE NOTES
Epidural catheter Procedure Note  Pre-Procedure   Staff -        Anesthesiologist:  Odin Madera MD       Performed By: anesthesiologist       Location: OB       Procedure Start/Stop Times: 7/11/2021 3:52 PM       Pre-Anesthestic Checklist: patient identified, IV checked, risks and benefits discussed, informed consent, monitors and equipment checked, pre-op evaluation, at physician/surgeon's request and post-op pain management  Timeout:       Correct Patient: Yes        Correct Procedure: Yes        Correct Site: Yes        Correct Position: Yes   Procedure Documentation  Procedure: epidural catheter       Diagnosis: labor       Patient Position: sitting       Skin prep: Chloraprep       Local skin infiltrated with 2 mL of 1% lidocaine.        Insertion Site: L2-3. (midline approach).       Technique: LORT saline        EDGARDO at 5 cm.       Needle Type: ToUbicomy needle       Needle Gauge: 17.        Needle Length (Inches): 3.5        Catheter: 19 G.         Catheter threaded easily.         3 cm epidural space.         Threaded 8 cm at skin.         # of attempts: 1 and  # of redirects:  0    Assessment/Narrative         Paresthesias: No.       Test dose of 3 mL lidocaine 1.5% w/ 1:200,000 epinephrine at 16:12 CDT.         Test dose negative, 3 minutes after injection, for signs of intravascular, subdural, or intrathecal injection.       Insertion/Infusion Method: LORT saline       No aspiration negative for Heme or CSF via Epidural Catheter.

## 2021-07-11 NOTE — PROVIDER NOTIFICATION
07/11/21 1108   Provider Notification   Provider Name/Title Tigist Liza NESBITT CNM   Method of Notification Electronic Page   Request Evaluate in Person   Notification Reason Patient Request;SVE   Patient feeling pressure, requests SVE by CNM.

## 2021-07-11 NOTE — PROVIDER NOTIFICATION
07/11/21 0730   Provider Notification   Provider Name/Title Tigist Page DELICIA, MARY   Method of Notification In Department   Request Evaluate in Person   Notification Reason Status Update   Patient and support persons are asking to see CNM and discuss option to take a shower and perhaps take a break from Oxytocin Augmentation. Provider notified and will go see patient shortly.

## 2021-07-11 NOTE — PROGRESS NOTES
"CNM PROGRESS NOTE    SUBJECTIVE:  Received page from RN that SROM occurred with return of clear fluid. Patient did receive morphine and vistaril for therapeutic rest.     OBJECTIVE:  /50   Pulse 88   Temp 98.8  F (37.1  C) (Oral)   Resp 16   Ht 1.626 m (5' 4\")   Wt 93.9 kg (207 lb)   LMP 09/25/2020   Breastfeeding No   BMI 35.53 kg/m      Fetal heart tones: Baseline 130   Variability: moderate  Accelerations: present  Decelerations: absent    Contractions: Pt is guerita every 1-6 minutes, lasting 60-80 seconds     Cervix:deferred  ROM: clear fluid    Pitocin- 22 mu/min.  Antibiotics- none  Cervical ripening: N/A    ASSESSMENT:  IUP @ 41w2d IOL for oligohydramnios; late term gestation; BPP 4/8  GBS- negative  Covid neg     PLAN:   Continue with pitocin titration as able.    bedside and supportive with patient and partner.       Sneha Nelson CNM      "

## 2021-07-11 NOTE — PROGRESS NOTES
"CNM PROGRESS NOTE    SUBJECTIVE:  Seema is feeling more uncomfortable with the contractions in the past 1.5 hours. Her  is now bedside and helping her through contractions. Robert is bedside and helping her labor as well. She is coping, but is getting tired. Agreeable to SVE.    OBJECTIVE:  /71   Pulse 77   Temp 98.4  F (36.9  C) (Oral)   Resp 18   Ht 1.626 m (5' 4\")   Wt 93.9 kg (207 lb)   LMP 09/25/2020   Breastfeeding No   BMI 35.53 kg/m      Fetal heart tones: Baseline 145   Variability: moderate  Accelerations: present  Decelerations: absent    Contractions: Pt is guerita every 2-5 minutes, lasting 60-80 seconds and palpates moderate    Cervix: 4/ 90% / 0, Vtx   ROM: not ruptured    Pitocin- 17 mu/min.  Antibiotics- none  Cervical ripening: N/A    ASSESSMENT:  IUP @ 41w1d early labor and IOL for oligohydramnios; late term gestation; BPP 4/8  GBS- negative  Covid neg     PLAN:   Reviewed minimal cervical change in 12 hours. Recommended AROM to help facilitate labor process, but patient declines at this time.   Discussed therapeutic rest as an option at this time to help with labor pains of early labor. Patient is possibly interested in this option. Orders placed and Seema can request as desired.   Continue with pitocin titration as able.   reevaluate in 2-4 hours/PRN    Sneha Nelson CNM      "

## 2021-07-11 NOTE — PLAN OF CARE
Laboring in room, using birth-ball and different labor positions, coping well with labor,  at bedside and is supportive,  at bedside. See flow sheet for FHR and contraction pattern.  1:1 RN care provided.  Will continue to monitor and will notify provider if there is a change in status. Anticipate .

## 2021-07-11 NOTE — PROGRESS NOTES
"Blood pressure 109/67, pulse 66, temperature 98.5  F (36.9  C), temperature source Oral, resp. rate 18, height 1.626 m (5' 4\"), weight 93.9 kg (207 lb), last menstrual period 09/25/2020, SpO2 99 %, unknown if currently breastfeeding.  General appearance: uncomfortable with contractions  But had relief with sleep meds    CONTRACTIONS: Contractions every 2-5 minutes.  Palpate: moderate  Pitocin- 22 mu/min.,  Antibiotics- none  FETAL HEART TONES: baseline 135 with moderate FHR variability and  pos accelerations.  No decelerations present.        ROM: clear fluid  PELVIC EXAM:deferred        ASSESSMENT:  ==============  IUP @ 41w2d minimal/no progress and IOL for postdates   Fetal Heart rate tracing  category one  GBS- negative           PLAN:  ===========  comfort measures prn   Labor induction with Pitocin  reevaluate in 2-4 hours/PRN     DELICIA BryanM,EZEKIELM, APRN      "

## 2021-07-11 NOTE — PROGRESS NOTES
"Subjective:  Spoke with Kary's nurse. Pt agreed to taking morphine and vistaril for therapeutic sleep. Resting comfortably in bed. Agreeable to having pitocin increased throughout night.    Objective:  /68   Pulse 88   Temp 99  F (37.2  C) (Oral)   Resp 16   Ht 1.626 m (5' 4\")   Wt 93.9 kg (207 lb)   LMP 09/25/2020   Breastfeeding No   BMI 35.53 kg/m      FHR Marked variability. Baseline indeterminate at this time.      ASSESSMENT:  IUP @ 41w1d early labor and IOL for oligohydramnios; late term gestation; BPP 4/8  GBS - negative  Covid -  negative     PLAN:   Continue with therapeutic rest   Continue with pitocin titration as able.   reevaluate in 2-4 hours/PRN    Sneha Nelson CNM on 7/11/2021 at 12:37 AM    "

## 2021-07-11 NOTE — PROVIDER NOTIFICATION
07/11/21 1455   Provider Notification   Provider Name/Title Tigist Liza NESBITT CNM   Method of Notification Electronic Page   Request Evaluate in Person   Notification Reason Patient Request;SVE

## 2021-07-11 NOTE — PROGRESS NOTES
"CNM PROGRESS NOTE    SUBJECTIVE:  In room to check on patient and partner. Kary reports that her  was recently here and helped assist her with multiple position changes and stretches. Since that time, Kary feels contractions have intensified and are now radiating in her low back. On hands and knees with Robert doing double hip squeeze for comfort measures. Needing to focus inward during contractions. Continuing to have intermittent bloody show/mucous discharge. Agreeable to plan of care discussion.    OBJECTIVE:  /71   Pulse 77   Temp 98.4  F (36.9  C) (Oral)   Resp 18   Ht 1.626 m (5' 4\")   Wt 93.9 kg (207 lb)   LMP 09/25/2020   Breastfeeding No   BMI 35.53 kg/m      Fetal heart tones: Baseline 130   Variability: moderate  Accelerations: present  Decelerations: absent    Contractions: Pt is guertia every 1-4 minutes, lasting  seconds     Cervix: deferred  ROM: not ruptured    Pitocin- 17 mu/min. On since 1800 on 7/9/21  Antibiotics- none  Cervical ripening: N/A    ASSESSMENT:  IUP @ 41w1d IOL for oligohydramnios; late term gestation; BPP 4/8 - early labor  GBS- negative  Covid negative     PLAN:   Discussed last SVE occurred around 9am, offered check. Patient declines at this time, agreeable to check at 9pm.   Recommend patient call her  back if she feels she needs extra support with contractions, currently coping well.  reevaluate in 2 hours/PRN    Sneha Nelson CNM      "

## 2021-07-11 NOTE — PROVIDER NOTIFICATION
07/11/21 1448   Provider Notification   Provider Name/Title Tigist Page MARY NESBITT   Method of Notification Electronic Page   Request Evaluate - Remote   Notification Reason Decels;Labor Status;Status Update   Informed provider that Fentanyl was given and the subsequent decelerations and interventions. Provider can review EFM strip. See flowsheets.

## 2021-07-11 NOTE — ANESTHESIA PREPROCEDURE EVALUATION
Anesthesia Pre-Procedure Evaluation    Patient: Kary Lund   MRN: [id number] : 1990        Preoperative Diagnosis: * No pre-op diagnosis entered *   Procedure : * No procedures listed *     History reviewed. No pertinent past medical history.   History reviewed. No pertinent surgical history.   Allergies   Allergen Reactions     Clindamycin/Lincomycin Rash     Amoxicillin       Social History     Tobacco Use     Smoking status: Never Smoker     Smokeless tobacco: Never Used   Substance Use Topics     Alcohol use: Not Currently      Wt Readings from Last 1 Encounters:   21 93.9 kg (207 lb)        Anesthesia Evaluation   Pt has had prior anesthetic. Type: OB Labor Epidural.    No history of anesthetic complications       ROS/MED HX  ENT/Pulmonary:  - neg pulmonary ROS     Neurologic:  - neg neurologic ROS     Cardiovascular:  - neg cardiovascular ROS     METS/Exercise Tolerance:     Hematologic:  - neg hematologic  ROS     Musculoskeletal:       GI/Hepatic:  - neg GI/hepatic ROS     Renal/Genitourinary:       Endo:  - neg endo ROS     Psychiatric/Substance Use:  - neg psychiatric ROS     Infectious Disease:       Malignancy:       Other:            Physical Exam    Airway        Mallampati: II   TM distance: > 3 FB   Neck ROM: full   Mouth opening: > 3 cm    Respiratory Devices and Support         Dental  no notable dental history         Cardiovascular   cardiovascular exam normal          Pulmonary   pulmonary exam normal                OUTSIDE LABS:  CBC: No results found for: WBC, HGB, HCT, PLT  BMP: No results found for: NA, POTASSIUM, CHLORIDE, CO2, BUN, CR, GLC  COAGS: No results found for: PTT, INR, FIBR  POC: No results found for: BGM, HCG, HCGS  HEPATIC: No results found for: ALBUMIN, PROTTOTAL, ALT, AST, GGT, ALKPHOS, BILITOTAL, BILIDIRECT, MENDOZA  OTHER: No results found for: PH, LACT, A1C, ROBER, PHOS, MAG, LIPASE, AMYLASE, TSH, T4, T3, CRP, SED    Anesthesia Plan    ASA Status:  2       Anesthesia Type: Epidural.              Consents    Anesthesia Plan(s) and associated risks, benefits, and realistic alternatives discussed. Questions answered and patient/representative(s) expressed understanding.     - Discussed with:  Patient         Postoperative Care            Comments:           neg OB ROS.       Issac Arrington MD

## 2021-07-11 NOTE — PLAN OF CARE
Care assumed by this writer @ 2330. Natalia monitor picking up 6 ctx/min. Akry up to bathroom and ambulating in room, responding to ctx q 2-4 minutes and breathing well through. Bud [place] and spouse Robert supportive.     FHR baseline tachycardic @ 165 from 1999-2177, discussed with Sneha Nelson CNM and MADISON Garcia. Appears to be returning to baseline of 140, moderate variability with accelerations. Maternal temp 99.0.     Positioned in L side-lying with peanut ball, morphine and vistaril given.    Plan to assess contraction strength via palpation and observation of maternal coping at bedside. Will consider TOCO monitor while in bed if Natalia is tracing non-palpable contractions.      in early labor.  IOL with Pitocin.    Continue titration of pitocin per protocol.  Promote maternal rest.  Continue current plan of care.

## 2021-07-11 NOTE — PLAN OF CARE
Maternal VSS, afebrile, coping with labor using support persons, tub, birthing ball, sling, movement, heat, ice, aromatherapy and also fentanyl. FHR normal baseline with moderate variability today. Accelerations present. Intermittent/episodic decelerations today. Cervical change today to 6/100/0. Continuous EFM, Oxytocin augmentation. Anticipate .

## 2021-07-12 ENCOUNTER — ANCILLARY PROCEDURE (OUTPATIENT)
Dept: ULTRASOUND IMAGING | Facility: CLINIC | Age: 31
End: 2021-07-12
Attending: ANESTHESIOLOGY
Payer: COMMERCIAL

## 2021-07-12 PROBLEM — Z98.891 S/P PRIMARY LOW TRANSVERSE C-SECTION: Status: ACTIVE | Noted: 2021-07-12

## 2021-07-12 LAB
CREAT SERPL-MCNC: 0.81 MG/DL (ref 0.52–1.04)
CREAT SERPL-MCNC: 1.22 MG/DL (ref 0.52–1.04)
GFR SERPL CREATININE-BSD FRML MDRD: 59 ML/MIN/1.73M2
GFR SERPL CREATININE-BSD FRML MDRD: >90 ML/MIN/1.73M2

## 2021-07-12 PROCEDURE — 250N000011 HC RX IP 250 OP 636: Performed by: STUDENT IN AN ORGANIZED HEALTH CARE EDUCATION/TRAINING PROGRAM

## 2021-07-12 PROCEDURE — 59514 CESAREAN DELIVERY ONLY: CPT | Mod: GC | Performed by: OBSTETRICS & GYNECOLOGY

## 2021-07-12 PROCEDURE — 370N000017 HC ANESTHESIA TECHNICAL FEE, PER MIN: Performed by: OBSTETRICS & GYNECOLOGY

## 2021-07-12 PROCEDURE — 82565 ASSAY OF CREATININE: CPT | Performed by: STUDENT IN AN ORGANIZED HEALTH CARE EDUCATION/TRAINING PROGRAM

## 2021-07-12 PROCEDURE — 258N000003 HC RX IP 258 OP 636: Performed by: ADVANCED PRACTICE MIDWIFE

## 2021-07-12 PROCEDURE — 272N000001 HC OR GENERAL SUPPLY STERILE: Performed by: OBSTETRICS & GYNECOLOGY

## 2021-07-12 PROCEDURE — C9290 INJ, BUPIVACAINE LIPOSOME: HCPCS | Performed by: ANESTHESIOLOGY

## 2021-07-12 PROCEDURE — 250N000011 HC RX IP 250 OP 636: Performed by: ADVANCED PRACTICE MIDWIFE

## 2021-07-12 PROCEDURE — 250N000011 HC RX IP 250 OP 636: Performed by: OBSTETRICS & GYNECOLOGY

## 2021-07-12 PROCEDURE — 360N000076 HC SURGERY LEVEL 3, PER MIN: Performed by: OBSTETRICS & GYNECOLOGY

## 2021-07-12 PROCEDURE — 258N000003 HC RX IP 258 OP 636: Performed by: STUDENT IN AN ORGANIZED HEALTH CARE EDUCATION/TRAINING PROGRAM

## 2021-07-12 PROCEDURE — 250N000011 HC RX IP 250 OP 636: Performed by: ANESTHESIOLOGY

## 2021-07-12 PROCEDURE — 999N000016 HC STATISTIC ATTENDANCE AT DELIVERY

## 2021-07-12 PROCEDURE — 271N000001 HC OR GENERAL SUPPLY NON-STERILE: Performed by: OBSTETRICS & GYNECOLOGY

## 2021-07-12 PROCEDURE — 250N000013 HC RX MED GY IP 250 OP 250 PS 637

## 2021-07-12 PROCEDURE — 250N000009 HC RX 250: Performed by: ADVANCED PRACTICE MIDWIFE

## 2021-07-12 PROCEDURE — 250N000013 HC RX MED GY IP 250 OP 250 PS 637: Performed by: STUDENT IN AN ORGANIZED HEALTH CARE EDUCATION/TRAINING PROGRAM

## 2021-07-12 PROCEDURE — 710N000010 HC RECOVERY PHASE 1, LEVEL 2, PER MIN: Performed by: OBSTETRICS & GYNECOLOGY

## 2021-07-12 PROCEDURE — 36415 COLL VENOUS BLD VENIPUNCTURE: CPT | Performed by: STUDENT IN AN ORGANIZED HEALTH CARE EDUCATION/TRAINING PROGRAM

## 2021-07-12 PROCEDURE — 258N000003 HC RX IP 258 OP 636: Performed by: OBSTETRICS & GYNECOLOGY

## 2021-07-12 PROCEDURE — 250N000009 HC RX 250: Performed by: ANESTHESIOLOGY

## 2021-07-12 PROCEDURE — 250N000009 HC RX 250: Performed by: STUDENT IN AN ORGANIZED HEALTH CARE EDUCATION/TRAINING PROGRAM

## 2021-07-12 PROCEDURE — 120N000002 HC R&B MED SURG/OB UMMC

## 2021-07-12 RX ORDER — MISOPROSTOL 200 UG/1
TABLET ORAL
Status: DISCONTINUED
Start: 2021-07-12 | End: 2021-07-12 | Stop reason: HOSPADM

## 2021-07-12 RX ORDER — PROCHLORPERAZINE MALEATE 10 MG
10 TABLET ORAL EVERY 6 HOURS PRN
Status: DISCONTINUED | OUTPATIENT
Start: 2021-07-12 | End: 2021-07-14 | Stop reason: HOSPADM

## 2021-07-12 RX ORDER — TRANEXAMIC ACID 10 MG/ML
1 INJECTION, SOLUTION INTRAVENOUS EVERY 30 MIN PRN
Status: DISCONTINUED | OUTPATIENT
Start: 2021-07-12 | End: 2021-07-12 | Stop reason: HOSPADM

## 2021-07-12 RX ORDER — CITRIC ACID/SODIUM CITRATE 334-500MG
30 SOLUTION, ORAL ORAL
Status: COMPLETED | OUTPATIENT
Start: 2021-07-12 | End: 2021-07-12

## 2021-07-12 RX ORDER — METHYLERGONOVINE MALEATE 0.2 MG/ML
200 INJECTION INTRAVENOUS
Status: DISCONTINUED | OUTPATIENT
Start: 2021-07-12 | End: 2021-07-14 | Stop reason: HOSPADM

## 2021-07-12 RX ORDER — ONDANSETRON 4 MG/1
4 TABLET, ORALLY DISINTEGRATING ORAL EVERY 6 HOURS PRN
Status: DISCONTINUED | OUTPATIENT
Start: 2021-07-12 | End: 2021-07-14 | Stop reason: HOSPADM

## 2021-07-12 RX ORDER — MISOPROSTOL 200 UG/1
400 TABLET ORAL PRN
Status: DISCONTINUED | OUTPATIENT
Start: 2021-07-12 | End: 2021-07-12 | Stop reason: HOSPADM

## 2021-07-12 RX ORDER — FENTANYL CITRATE 50 UG/ML
INJECTION, SOLUTION INTRAMUSCULAR; INTRAVENOUS PRN
Status: DISCONTINUED | OUTPATIENT
Start: 2021-07-12 | End: 2021-07-12

## 2021-07-12 RX ORDER — OXYCODONE HYDROCHLORIDE 5 MG/1
5 TABLET ORAL EVERY 4 HOURS PRN
Status: DISCONTINUED | OUTPATIENT
Start: 2021-07-12 | End: 2021-07-14 | Stop reason: HOSPADM

## 2021-07-12 RX ORDER — OXYTOCIN 10 [USP'U]/ML
10 INJECTION, SOLUTION INTRAMUSCULAR; INTRAVENOUS ONCE
Status: CANCELLED | OUTPATIENT
Start: 2021-07-12

## 2021-07-12 RX ORDER — AMOXICILLIN 250 MG
2 CAPSULE ORAL 2 TIMES DAILY
Status: DISCONTINUED | OUTPATIENT
Start: 2021-07-12 | End: 2021-07-14 | Stop reason: HOSPADM

## 2021-07-12 RX ORDER — IBUPROFEN 600 MG/1
600 TABLET, FILM COATED ORAL EVERY 6 HOURS PRN
Qty: 60 TABLET | Refills: 0 | Status: SHIPPED | OUTPATIENT
Start: 2021-07-12 | End: 2021-07-29

## 2021-07-12 RX ORDER — CARBOPROST TROMETHAMINE 250 UG/ML
250 INJECTION, SOLUTION INTRAMUSCULAR
Status: DISCONTINUED | OUTPATIENT
Start: 2021-07-12 | End: 2021-07-12 | Stop reason: HOSPADM

## 2021-07-12 RX ORDER — MISOPROSTOL 200 UG/1
800 TABLET ORAL PRN
Status: DISCONTINUED | OUTPATIENT
Start: 2021-07-12 | End: 2021-07-14 | Stop reason: HOSPADM

## 2021-07-12 RX ORDER — DIPHENHYDRAMINE HCL 25 MG
25 CAPSULE ORAL EVERY 6 HOURS PRN
Status: DISCONTINUED | OUTPATIENT
Start: 2021-07-12 | End: 2021-07-14 | Stop reason: HOSPADM

## 2021-07-12 RX ORDER — OXYTOCIN/0.9 % SODIUM CHLORIDE 30/500 ML
100-340 PLASTIC BAG, INJECTION (ML) INTRAVENOUS ONCE
Status: CANCELLED | OUTPATIENT
Start: 2021-07-12 | End: 2021-07-12

## 2021-07-12 RX ORDER — SIMETHICONE 80 MG
80 TABLET,CHEWABLE ORAL 4 TIMES DAILY PRN
Status: DISCONTINUED | OUTPATIENT
Start: 2021-07-12 | End: 2021-07-14 | Stop reason: HOSPADM

## 2021-07-12 RX ORDER — BUPIVACAINE HYDROCHLORIDE 2.5 MG/ML
INJECTION, SOLUTION EPIDURAL; INFILTRATION; INTRACAUDAL
Status: DISCONTINUED | OUTPATIENT
Start: 2021-07-12 | End: 2021-07-12

## 2021-07-12 RX ORDER — KETOROLAC TROMETHAMINE 30 MG/ML
30 INJECTION, SOLUTION INTRAMUSCULAR; INTRAVENOUS EVERY 6 HOURS
Status: COMPLETED | OUTPATIENT
Start: 2021-07-12 | End: 2021-07-12

## 2021-07-12 RX ORDER — LIDOCAINE 40 MG/G
CREAM TOPICAL
Status: DISCONTINUED | OUTPATIENT
Start: 2021-07-12 | End: 2021-07-14 | Stop reason: HOSPADM

## 2021-07-12 RX ORDER — METOCLOPRAMIDE HYDROCHLORIDE 5 MG/ML
10 INJECTION INTRAMUSCULAR; INTRAVENOUS EVERY 6 HOURS PRN
Status: DISCONTINUED | OUTPATIENT
Start: 2021-07-12 | End: 2021-07-14 | Stop reason: HOSPADM

## 2021-07-12 RX ORDER — OXYTOCIN/0.9 % SODIUM CHLORIDE 30/500 ML
340 PLASTIC BAG, INJECTION (ML) INTRAVENOUS CONTINUOUS PRN
Status: DISCONTINUED | OUTPATIENT
Start: 2021-07-12 | End: 2021-07-14 | Stop reason: HOSPADM

## 2021-07-12 RX ORDER — LIDOCAINE HYDROCHLORIDE 20 MG/ML
INJECTION, SOLUTION EPIDURAL; INFILTRATION; INTRACAUDAL; PERINEURAL PRN
Status: DISCONTINUED | OUTPATIENT
Start: 2021-07-12 | End: 2021-07-12

## 2021-07-12 RX ORDER — MISOPROSTOL 200 UG/1
400 TABLET ORAL PRN
Status: DISCONTINUED | OUTPATIENT
Start: 2021-07-12 | End: 2021-07-14 | Stop reason: HOSPADM

## 2021-07-12 RX ORDER — AMOXICILLIN 250 MG
1 CAPSULE ORAL 2 TIMES DAILY
Status: DISCONTINUED | OUTPATIENT
Start: 2021-07-12 | End: 2021-07-14 | Stop reason: HOSPADM

## 2021-07-12 RX ORDER — METHYLERGONOVINE MALEATE 0.2 MG/ML
200 INJECTION INTRAVENOUS
Status: DISCONTINUED | OUTPATIENT
Start: 2021-07-12 | End: 2021-07-12 | Stop reason: HOSPADM

## 2021-07-12 RX ORDER — CEFAZOLIN SODIUM 2 G/100ML
2 INJECTION, SOLUTION INTRAVENOUS SEE ADMIN INSTRUCTIONS
Status: DISCONTINUED | OUTPATIENT
Start: 2021-07-12 | End: 2021-07-12 | Stop reason: HOSPADM

## 2021-07-12 RX ORDER — DIPHENHYDRAMINE HYDROCHLORIDE 50 MG/ML
25 INJECTION INTRAMUSCULAR; INTRAVENOUS EVERY 6 HOURS PRN
Status: DISCONTINUED | OUTPATIENT
Start: 2021-07-12 | End: 2021-07-14 | Stop reason: HOSPADM

## 2021-07-12 RX ORDER — CITRIC ACID/SODIUM CITRATE 334-500MG
SOLUTION, ORAL ORAL
Status: COMPLETED
Start: 2021-07-12 | End: 2021-07-12

## 2021-07-12 RX ORDER — AZITHROMYCIN 500 MG/5ML
500 INJECTION, POWDER, LYOPHILIZED, FOR SOLUTION INTRAVENOUS
Status: COMPLETED | OUTPATIENT
Start: 2021-07-12 | End: 2021-07-12

## 2021-07-12 RX ORDER — MODIFIED LANOLIN
OINTMENT (GRAM) TOPICAL
Status: DISCONTINUED | OUTPATIENT
Start: 2021-07-12 | End: 2021-07-14 | Stop reason: HOSPADM

## 2021-07-12 RX ORDER — ACETAMINOPHEN 325 MG/1
975 TABLET ORAL EVERY 6 HOURS
Status: DISCONTINUED | OUTPATIENT
Start: 2021-07-12 | End: 2021-07-14 | Stop reason: HOSPADM

## 2021-07-12 RX ORDER — IBUPROFEN 800 MG/1
800 TABLET, FILM COATED ORAL EVERY 6 HOURS
Status: DISCONTINUED | OUTPATIENT
Start: 2021-07-13 | End: 2021-07-14 | Stop reason: HOSPADM

## 2021-07-12 RX ORDER — OXYTOCIN/0.9 % SODIUM CHLORIDE 30/500 ML
340 PLASTIC BAG, INJECTION (ML) INTRAVENOUS CONTINUOUS PRN
Status: COMPLETED | OUTPATIENT
Start: 2021-07-12 | End: 2021-07-12

## 2021-07-12 RX ORDER — CEFAZOLIN SODIUM 1 G/50ML
1250 SOLUTION INTRAVENOUS ONCE
Status: COMPLETED | OUTPATIENT
Start: 2021-07-12 | End: 2021-07-12

## 2021-07-12 RX ORDER — ONDANSETRON 2 MG/ML
4 INJECTION INTRAMUSCULAR; INTRAVENOUS EVERY 6 HOURS PRN
Status: DISCONTINUED | OUTPATIENT
Start: 2021-07-12 | End: 2021-07-14 | Stop reason: HOSPADM

## 2021-07-12 RX ORDER — CARBOPROST TROMETHAMINE 250 UG/ML
250 INJECTION, SOLUTION INTRAMUSCULAR
Status: DISCONTINUED | OUTPATIENT
Start: 2021-07-12 | End: 2021-07-14 | Stop reason: HOSPADM

## 2021-07-12 RX ORDER — ACETAMINOPHEN 325 MG/1
650 TABLET ORAL EVERY 6 HOURS PRN
Qty: 100 TABLET | Refills: 0 | Status: SHIPPED | OUTPATIENT
Start: 2021-07-12 | End: 2021-07-29

## 2021-07-12 RX ORDER — AMOXICILLIN 250 MG
1 CAPSULE ORAL DAILY
Qty: 100 TABLET | Refills: 0 | Status: SHIPPED | OUTPATIENT
Start: 2021-07-12 | End: 2021-07-29

## 2021-07-12 RX ORDER — LIDOCAINE 40 MG/G
CREAM TOPICAL
Status: DISCONTINUED | OUTPATIENT
Start: 2021-07-12 | End: 2021-07-12 | Stop reason: HOSPADM

## 2021-07-12 RX ORDER — PROCHLORPERAZINE 25 MG
25 SUPPOSITORY, RECTAL RECTAL EVERY 12 HOURS PRN
Status: DISCONTINUED | OUTPATIENT
Start: 2021-07-12 | End: 2021-07-14 | Stop reason: HOSPADM

## 2021-07-12 RX ORDER — METOCLOPRAMIDE 10 MG/1
10 TABLET ORAL EVERY 6 HOURS PRN
Status: DISCONTINUED | OUTPATIENT
Start: 2021-07-12 | End: 2021-07-14 | Stop reason: HOSPADM

## 2021-07-12 RX ORDER — MISOPROSTOL 200 UG/1
800 TABLET ORAL PRN
Status: DISCONTINUED | OUTPATIENT
Start: 2021-07-12 | End: 2021-07-12 | Stop reason: HOSPADM

## 2021-07-12 RX ORDER — SODIUM CHLORIDE, SODIUM LACTATE, POTASSIUM CHLORIDE, CALCIUM CHLORIDE 600; 310; 30; 20 MG/100ML; MG/100ML; MG/100ML; MG/100ML
INJECTION, SOLUTION INTRAVENOUS CONTINUOUS
Status: DISCONTINUED | OUTPATIENT
Start: 2021-07-12 | End: 2021-07-12 | Stop reason: HOSPADM

## 2021-07-12 RX ORDER — DEXTROSE, SODIUM CHLORIDE, SODIUM LACTATE, POTASSIUM CHLORIDE, AND CALCIUM CHLORIDE 5; .6; .31; .03; .02 G/100ML; G/100ML; G/100ML; G/100ML; G/100ML
INJECTION, SOLUTION INTRAVENOUS CONTINUOUS
Status: DISCONTINUED | OUTPATIENT
Start: 2021-07-12 | End: 2021-07-14 | Stop reason: HOSPADM

## 2021-07-12 RX ORDER — OXYTOCIN 10 [USP'U]/ML
10 INJECTION, SOLUTION INTRAMUSCULAR; INTRAVENOUS PRN
Status: DISCONTINUED | OUTPATIENT
Start: 2021-07-12 | End: 2021-07-12 | Stop reason: HOSPADM

## 2021-07-12 RX ORDER — TRANEXAMIC ACID 10 MG/ML
1 INJECTION, SOLUTION INTRAVENOUS EVERY 30 MIN PRN
Status: DISCONTINUED | OUTPATIENT
Start: 2021-07-12 | End: 2021-07-14 | Stop reason: HOSPADM

## 2021-07-12 RX ORDER — HYDROCORTISONE 2.5 %
CREAM (GRAM) TOPICAL 3 TIMES DAILY PRN
Status: DISCONTINUED | OUTPATIENT
Start: 2021-07-12 | End: 2021-07-14 | Stop reason: HOSPADM

## 2021-07-12 RX ORDER — CEFAZOLIN SODIUM 2 G/100ML
2 INJECTION, SOLUTION INTRAVENOUS
Status: COMPLETED | OUTPATIENT
Start: 2021-07-12 | End: 2021-07-12

## 2021-07-12 RX ORDER — OXYTOCIN 10 [USP'U]/ML
10 INJECTION, SOLUTION INTRAMUSCULAR; INTRAVENOUS PRN
Status: DISCONTINUED | OUTPATIENT
Start: 2021-07-12 | End: 2021-07-14 | Stop reason: HOSPADM

## 2021-07-12 RX ORDER — BISACODYL 10 MG
10 SUPPOSITORY, RECTAL RECTAL DAILY PRN
Status: DISCONTINUED | OUTPATIENT
Start: 2021-07-14 | End: 2021-07-14 | Stop reason: HOSPADM

## 2021-07-12 RX ADMIN — ACETAMINOPHEN 975 MG: 325 TABLET, FILM COATED ORAL at 17:34

## 2021-07-12 RX ADMIN — Medication 500 MG: at 02:42

## 2021-07-12 RX ADMIN — ACETAMINOPHEN 975 MG: 325 TABLET, FILM COATED ORAL at 11:17

## 2021-07-12 RX ADMIN — ONDANSETRON 8 MG: 2 INJECTION INTRAMUSCULAR; INTRAVENOUS at 02:49

## 2021-07-12 RX ADMIN — ACETAMINOPHEN 975 MG: 325 TABLET, FILM COATED ORAL at 23:30

## 2021-07-12 RX ADMIN — ENOXAPARIN SODIUM 40 MG: 40 INJECTION SUBCUTANEOUS at 17:35

## 2021-07-12 RX ADMIN — Medication 30 ML: at 02:19

## 2021-07-12 RX ADMIN — SODIUM CITRATE AND CITRIC ACID MONOHYDRATE 30 ML: 500; 334 SOLUTION ORAL at 02:19

## 2021-07-12 RX ADMIN — SIMETHICONE 80 MG: 80 TABLET, CHEWABLE ORAL at 23:37

## 2021-07-12 RX ADMIN — SODIUM CHLORIDE, POTASSIUM CHLORIDE, SODIUM LACTATE AND CALCIUM CHLORIDE: 600; 310; 30; 20 INJECTION, SOLUTION INTRAVENOUS at 02:29

## 2021-07-12 RX ADMIN — SODIUM CHLORIDE, POTASSIUM CHLORIDE, SODIUM LACTATE AND CALCIUM CHLORIDE: 600; 310; 30; 20 INJECTION, SOLUTION INTRAVENOUS at 07:14

## 2021-07-12 RX ADMIN — KETOROLAC TROMETHAMINE 30 MG: 30 INJECTION, SOLUTION INTRAMUSCULAR; INTRAVENOUS at 12:36

## 2021-07-12 RX ADMIN — KETOROLAC TROMETHAMINE 30 MG: 30 INJECTION, SOLUTION INTRAMUSCULAR; INTRAVENOUS at 06:02

## 2021-07-12 RX ADMIN — Medication 25 MCG/MIN: at 02:44

## 2021-07-12 RX ADMIN — SIMETHICONE 80 MG: 80 TABLET, CHEWABLE ORAL at 17:34

## 2021-07-12 RX ADMIN — SODIUM CHLORIDE, POTASSIUM CHLORIDE, SODIUM LACTATE AND CALCIUM CHLORIDE: 600; 310; 30; 20 INJECTION, SOLUTION INTRAVENOUS at 04:03

## 2021-07-12 RX ADMIN — OXYTOCIN-SODIUM CHLORIDE 0.9% IV SOLN 30 UNIT/500ML 600 ML/HR: 30-0.9/5 SOLUTION at 03:10

## 2021-07-12 RX ADMIN — DOCUSATE SODIUM AND SENNOSIDES 1 TABLET: 8.6; 5 TABLET ORAL at 11:17

## 2021-07-12 RX ADMIN — Medication 100 ML/HR: at 05:23

## 2021-07-12 RX ADMIN — Medication 2 G: at 02:38

## 2021-07-12 RX ADMIN — LIDOCAINE HYDROCHLORIDE 20 ML: 20 INJECTION, SOLUTION EPIDURAL; INFILTRATION; INTRACAUDAL; PERINEURAL at 02:40

## 2021-07-12 RX ADMIN — FENTANYL CITRATE 100 MCG: 50 INJECTION, SOLUTION INTRAMUSCULAR; INTRAVENOUS at 02:36

## 2021-07-12 RX ADMIN — BUPIVACAINE 20 ML: 13.3 INJECTION, SUSPENSION, LIPOSOMAL INFILTRATION at 04:20

## 2021-07-12 RX ADMIN — DOCUSATE SODIUM AND SENNOSIDES 1 TABLET: 8.6; 5 TABLET ORAL at 19:35

## 2021-07-12 RX ADMIN — BUPIVACAINE HYDROCHLORIDE 20 ML: 2.5 INJECTION, SOLUTION EPIDURAL; INFILTRATION; INTRACAUDAL at 04:20

## 2021-07-12 RX ADMIN — Medication: at 00:18

## 2021-07-12 RX ADMIN — GENTAMICIN SULFATE 470 MG: 40 INJECTION, SOLUTION INTRAMUSCULAR; INTRAVENOUS at 07:26

## 2021-07-12 RX ADMIN — SIMETHICONE 80 MG: 80 TABLET, CHEWABLE ORAL at 11:17

## 2021-07-12 RX ADMIN — KETOROLAC TROMETHAMINE 30 MG: 30 INJECTION, SOLUTION INTRAMUSCULAR; INTRAVENOUS at 19:35

## 2021-07-12 RX ADMIN — VANCOMYCIN HYDROCHLORIDE 1250 MG: 10 INJECTION, POWDER, LYOPHILIZED, FOR SOLUTION INTRAVENOUS at 09:17

## 2021-07-12 NOTE — LACTATION NOTE
This note was copied from a baby's chart.  Consult for: first time breastfeeding.     History:   delivery for arrest of descent @ 41w3d, AGA infant @ 7# 14.3 oz. birthweight, <12 hours old at time of visit, spitting up intermittently.  No significant medical history for mom, oligohydramnios diagnosed at 41 weeks and sent in for induction.     Breast exam of mom: Kary noted early tenderness, darker and larger areolae & bilateral breast growth during pregnancy.     Oral exam of baby: Borderline length of tongue beyond lingual attachment, he can bring tongue past lower alveolar ridge but only biting on finger; unable to elicit organized suck prior to feeding (12 hours old).     Feeding assessment:  Sleepy and disinterested, took >10 minutes to get latch then only sucked for a few minutes with stimulation. Attempted both sides and different positions then hand expressed to feed colostrum via spoon.     Education provided: Discussed positioning with good support, anatomy of breast and infant mouth, tips to get and maintain deeper latch, breast compressions prn to enhance milk transfer, benefits of skin to skin and feeding on cue, supply and demand, benefits of and how to do breast massage & hand expression. Reviewed baby's second night and encouraged naps today, orient to breastfeeding log and ways to tell if getting enough.     Feeding Plan: Frequent skin to skin, breastfeed on cue 8 to 12 times in 24 hours. Continue hand expressing after feedings until milk is in, feed back results. Follow up with outpatient lactation consultant as needed for support with first time breastfeeding.

## 2021-07-12 NOTE — PROGRESS NOTES
"EZEKIELM PROGRESS NOTE    SUBJECTIVE:  Patient has been pushing in numerous positions for a collective time of approximately 3.5 hours. Her energy level is overall feeling well, but would like to take a 15 min break.     OBJECTIVE:  /60   Pulse 82   Temp 98.7  F (37.1  C) (Oral)   Resp 16   Ht 1.626 m (5' 4\")   Wt 93.9 kg (207 lb)   LMP 09/25/2020   SpO2 100%   Breastfeeding No   BMI 35.53 kg/m      Fetal heart tones: Baseline 150   Variability: moderate  Accelerations: present  Decelerations: absent    Contractions: Pt is guerita every 2-10 minutes, lasting  seconds and palpates strong    Cervix: unchanged from previous exams. Fetal bones at +1, more caput has developed.   ROM: clear fluid    Pitocin- 24 mu/min.  Antibiotics- none  Cervical ripening: N/A    ASSESSMENT:  IUP @ 41w3d second stage labor and IOL for oligohydramnios   GBS- negative  Covid neg  Arrest of descent     PLAN:   Will let patient rest for 15 minutes, then will resume pushing for another 30 minutes and if no significant change will plan to consult MD service.   Discussion with patient, partner, and  about concern for lack of change in fetal position and station with excellent maternal pushing efforts. Patient is understanding and in agreement with rest, resumption of pushing, and then consult with OB if needed.      Sneha Nelson CNM      "

## 2021-07-12 NOTE — PROVIDER NOTIFICATION
Notified the pharmacist of a most recent Cr of 1.22. Pt had already been dosed with a Cr of 0.81. Wondered if current 1,250 mg in 250 mL dose is okay for pt. Pharmacist confirmed that:     1) This is still okay because it's the frequency, not the dose that counts.   2) The current Vacomycin infusing is a one time dose.   3) She is already dosed at a lower rate.

## 2021-07-12 NOTE — OP NOTE
[facility]  Full Operative Progress Note     Surgery Date:  2021    Surgeon:  Serina Liu MD    Assistants:  Sheldon Crum MD PGY-3      Pre-op Diagnosis:    - Intrauterine pregnancy at 41w3d  - arrest of descent  - oligohydramnios  - BPP of 8  - obesity     Post-op Diagnosis:    - Same   - Liveborn male infant     Procedure: Primary low-transverse  section with double layer uterine closure via pfannenstiel incision    Anesthesia: Spinal  EBL: 758 ml   IVF: 1100 mL crystalloid  UOP: 100 mL clear urine at the end of the case  Drains: Alford Catheter   Specimens: Routine cord blood, cord segment  Complications: None apparent    Indications:   Kary Lund is a 30 year old  at 41w3d was transferred from [facility] for induction of labor for BPP of  and oligohydramnios. Patient was admitted on 2021 and had slow labor progress. She ultimately reached complete dilation and began pushing. Over the course of >4 hours, patient failed to make significant progress despite great effort. Manual rotation of baby from occiput posterior failed. A  section was recommended. The risks, benefits, and alternatives of  section were discussed with the patient including bleeding, infection, injury to surrounding structures (nerves, blood vessels, uterus, cervix, tubes, ovaries, bladder, bowel, rarely baby), and she agreed to proceed. Written consent obtained.     Findings:   Pus-like/meconium amniotic fluid  Liveborn male infant in left occiput posterior presentation. Apgars 8 at 1 minute & 9 at 5 minutes. Weight 3580g.  Normal uterus, fallopian tubes, and ovaries.     Procedure Details:   The patient was brought to the OR, where adequate spinal anesthesia was administered.  She was placed in the dorsal supine position with a slight leftward tilt. She was prepped and draped in the usual sterile fashion. A surgical time out was performed. A pfannenstiel skin  incision was made with the scalpel, and carried down to the underlying fascia with sharp and blunt dissection. The fascia was incised in the midline, and the incision was extended laterally with the Love scissors. The superior aspect of the fascia was grasped with the Kocher clamps and dissected off of the underlying rectus muscles with blunt and sharp dissection. Attention was then turned to the inferior aspect of the fascia, which was similarly dissected off of the underlying rectus muscles. The rectus muscles were  in the midline, and the peritoneum was entered bluntly, and the opening was extended with digital pressure and electrosurgery. The bladder blade was placed. The vesicouterine peritoneum was incised in the midline, and the incision was extended laterally with the Metzenbaum scissors. A bladder flap was created digitally and the bladder blade was replaced. A transverse hysterotomy was made with the scalpel in the lower uterine segment, and the incision was extended with digital pressure. The infant was noted to be in left occiput posterior position, and was delivered atraumatically. The shoulders delivered easily. No nuchal cord was noted. The cord was doubly clamped and cut after 60 seconds, and the infant was handed off to the awaiting nursery staff. A segment of cord was cut and collected. The placenta was delivered with gentle traction on the umbilical cord and uterine massage. The uterus was exteriorized and cleared of all clots and debris. Uterine tone was noted to be firm with pitocin given through the running IV and uterine massage.  The hysterotomy was closed with a running locked suture of 0 Vicryl.  The hysterotomy was then imbricated using an 0 Monocryl suture. The hysterotomy was noted to be hemostatic. The posterior cul-de-sac was cleared of all clots and debris. The uterus was returned to the abdomen. The pericolic gutters were cleared of all clots and debris. The hysterotomy was  reexamined and noted to be hemostatic. The fascia and rectus muscles were examined and areas of oozing were controlled with electrocautery.  The fascia was closed with a running 0 Vicryl suture. The subcutaneous tissue was irrigated and areas of oozing were controlled with electrocautery. The subcutaneous tissue was less than 2 cm in thickness, and was therefore not closed. The skin was closed with 4-0 Vicryl and covered with a sterile dressing.    All sponge, needle, and instrument counts were correct. The patient tolerated the procedure well, and was transferred to recovery in stable condition. Dr. Liu was present and scrubbed for the procedure.       Sheldon Crum MD  OB/GYN PGY-3  07/12/21 3:54 AM    Serina Liu MD

## 2021-07-12 NOTE — DISCHARGE SUMMARY
DELIVERY DISCHARGE SUMMARY    Kary Lund  : 1990  MRN: [id number]    Admit date: 2021  Discharge date: 2021    Admit Dx:   - 30 year old  at 41w3d  - obesity   - oligohydramnios  - BPP of     Discharge Dx:  - Same as above, s/p procedure below  - endometritis  - acute blood loss anemia  - arrest of descent    Procedures:  - Primary low transverse  section with double layer closure via Pfannenstiel incision    Consults:  Anesthesia  Lactation    Hospital course:  Indications:   Kary Lund is a 30 year old  at 41w3d was transferred from [facility] for induction of labor for BPP of  and oligohydramnios. Patient was admitted on 2021 and had slow labor progress. She ultimately reached complete dilation and began pushing. Over the course of >4 hours, patient failed to make significant progress despite great effort. Manual rotation of baby from occiput posterior failed. A  section was recommended. The risks, benefits, and alternatives of  section were discussed with the patient including bleeding, infection, injury to surrounding structures (nerves, blood vessels, uterus, cervix, tubes, ovaries, bladder, bowel, rarely baby), and she agreed to proceed. Written consent obtained.     Findings:   Pus-like/meconium amniotic fluid  Liveborn male infant in left occiput posterior presentation. Apgars 8 at 1 minute & 9 at 5 minutes. Weight 3580g.  Normal uterus, fallopian tubes, and ovaries.     EBL from the delivery was 798mL. Please see her  Section Operative Note for full details regarding her delivery.    Her postoperative course was complicated by endometritis diagnosed by fevers and tachycardia. She received one dose of vanc and gent. Her vital signs remained stable for >24 hours. On POD#2, she was meeting all of her postpartum goals and deemed stable for discharge. She was voiding without difficulty, tolerating a regular diet  without nausea and vomiting, her pain was well controlled on oral pain medicines and her lochia was appropriate. Her Rh status was pos and Rhogam was not indicated.      HGB  Recent Labs   Lab 07/13/21  0632 07/11/21  1625   HGB 9.5* 11.9       Discharge Medications:     Review of your medicines        Important    This medication list is not yet final, because your doctor or pharmacist is still double-checking some of the changes. Ask your nurse for an updated version.  Specifically ask about this and similar medications: oxyCODONE (ROXICODONE) 5 MG tablet       START taking        Dose / Directions   acetaminophen 325 MG tablet  Commonly known as: TYLENOL      Dose: 650 mg  Take 2 tablets (650 mg) by mouth every 6 hours as needed for mild pain Start after Delivery.  Quantity: 100 tablet  Refills: 0     cyanocobalamin 1000 MCG tablet  Commonly known as: VITAMIN B-12      Dose: 1,000 mcg  Take 1 tablet (1,000 mcg) by mouth daily  Quantity: 90 tablet  Refills: 3     ferrous sulfate 325 (65 Fe) MG tablet  Commonly known as: FEROSUL      Dose: 325 mg  Take 1 tablet (325 mg) by mouth daily (with breakfast)  Quantity: 90 tablet  Refills: 3     ibuprofen 600 MG tablet  Commonly known as: ADVIL/MOTRIN      Dose: 600 mg  Take 1 tablet (600 mg) by mouth every 6 hours as needed for moderate pain Start after delivery  Quantity: 60 tablet  Refills: 0     norethindrone 0.35 MG tablet  Commonly known as: MICRONOR      Dose: 0.35 mg  Take 1 tablet (0.35 mg) by mouth daily  Quantity: 90 tablet  Refills: 3     oxyCODONE 5 MG tablet  Commonly known as: ROXICODONE      Dose: 5 mg  Take 1 tablet (5 mg) by mouth every 6 hours as needed for severe pain  Quantity: 6 tablet  Refills: 0     senna-docusate 8.6-50 MG tablet  Commonly known as: SENOKOT-S/PERICOLACE      Dose: 1 tablet  Take 1 tablet by mouth daily Start after delivery.  Quantity: 100 tablet  Refills: 0     vitamin C 250 MG tablet  Commonly known as: ASCORBIC ACID      Dose:  250 mg  Take 1 tablet (250 mg) by mouth daily  Quantity: 90 tablet  Refills: 3            CONTINUE these medicines which have NOT CHANGED        Dose / Directions   norethindrone-ethinyl estradiol 1-20 MG-MCG tablet  Commonly known as: MICROGESTIN 1/20      TK 1 T PO  D  Refills: 1            STOP taking      sulfamethoxazole-trimethoprim 800-160 MG tablet  Commonly known as: BACTRIM DS                  Where to get your medicines        These medications were sent to [place], [place] - [address]  [address] [facility], [facility] [address]      Phone: [phone]   acetaminophen 325 MG tablet  cyanocobalamin 1000 MCG tablet  ferrous sulfate 325 (65 Fe) MG tablet  ibuprofen 600 MG tablet  norethindrone 0.35 MG tablet  senna-docusate 8.6-50 MG tablet  vitamin C 250 MG tablet       Information about where to get these medications is not yet available    Ask your nurse or doctor about these medications  oxyCODONE 5 MG tablet         Discharge/Disposition:  Kary Lund was discharged to home in stable condition with the following instructions/medications:  1) Call for temperature > 100.4, bright red vaginal bleeding >1 pad an hour x 2 hours, foul smelling vaginal discharge, pain not controlled by usual oral pain meds, persistent nausea and vomiting not controlled on medications, drainage or redness from incision site  2) She desired POP for contraception.  3) For feeding she decided to breastfeed.  4) She was instructed to follow-up with her primary OB in 6 weeks for a routine postpartum visit.  5) Discharge activity:  No heavy lifting >15 lbs or strenuous activity for 6 weeks, pelvic rest for 6 weeks, no driving or operating machinery while on narcotics.    Sheldon Crum MD  OB/GYN PGY-3  07/14/21 6:37 AM    [facility] staff:  Appreciate note by Dr. Crum.  I have seen and examined the patient without the resident. I have reviewed, edited, and agree with the  note.        Faina Pacheco MD, FACOG

## 2021-07-12 NOTE — PROVIDER NOTIFICATION
07/12/21 0535   Provider Notification   Provider Name/Title Dr. Crum   Method of Notification Electronic Page   Request Evaluate-Remote     Anesthesia said they did not give toradol in the OR. I was going to give it now and hold off on tylenol to monitor her temp but there is no order for toradol. Would you like it given in the PACU? Thanks!

## 2021-07-12 NOTE — PLAN OF CARE
0411 Arrival in PACU, shaky diaphoretic and heart rate in 140s.   Skin to skin and breastfeeding.  Labile temperatures, most < 100.4, highest 102.9. Providers aware.  Tachycardia improving, heart rate in 80s upon discharge from PACU.  No nausea or vomiting, tolerating ice chips and water.  FF U/1, scant bleeding.  Transfer to [facility] @ 0644

## 2021-07-12 NOTE — PROGRESS NOTES
"CNM PROGRESS NOTE    SUBJECTIVE:  In to see patient. She is feeling excellent pain relief from her epidural. Both Robert and her  are present bedside and supportive. Seema desires SVE.    OBJECTIVE:  /57   Pulse 89   Temp 98.4  F (36.9  C) (Oral)   Resp 18   Ht 1.626 m (5' 4\")   Wt 93.9 kg (207 lb)   LMP 09/25/2020   SpO2 100%   Breastfeeding No   BMI 35.53 kg/m      Fetal heart tones: Baseline 155   Variability: moderate  Accelerations: present  Decelerations: absent    Contractions: Pt is guerita every 2-4 minutes, lasting  seconds and palpates moderate    Cervix: 10/ 100% / 0, Vtx OP position  ROM: clear fluid    Pitocin- 24 mu/min.  Antibiotics- none  Cervical ripening: N/A    ASSESSMENT:  IUP @ 41w2d IOL for oligohydramnios and active labor   GBS- negative  Covid Neg     PLAN:   Discussed fetal position and techniques to help promote rotation while pushing. Will ready the room to begin active second stage.      Sneha Nelson CNM      "

## 2021-07-12 NOTE — PLAN OF CARE
Epidural placed at 1600, patient tolerated placement and has relief, coping well with labor. See flow sheet for FHR and contraction pattern.   at bedside and is supportive.  Bud Montana at bedside.  1:1 RN care provided.  Will continue to monitor and will notify provider if there is a change in status. Anticipate .

## 2021-07-12 NOTE — ANESTHESIA PROCEDURE NOTES
TAP Procedure Note  Pre-Procedure   Staff -        Anesthesiologist:  Willie Price DO       Performed By: anesthesiologist       Location: OR       Procedure Start/Stop Times: 7/12/2021 4:03 AM and 7/12/2021 4:15 AM       Pre-Anesthestic Checklist: patient identified, IV checked, site marked, risks and benefits discussed, informed consent, monitors and equipment checked, pre-op evaluation, at physician/surgeon's request and post-op pain management  Timeout:       Correct Patient: Yes        Correct Procedure: Yes        Correct Site: Yes        Correct Position: Yes        Correct Laterality: Yes        Site Marked: Yes  Procedure Documentation  Procedure: TAP       Laterality: bilateral       Patient Position: supine       Skin prep: Chloraprep       Insertion Site: T11-12.       Needle Type: short bevel       Needle Gauge: 21.        Needle Length (millimeters): 100        2. Ultrasound was used to visualize the spread of anesthetic in close proximity to the above referenced structure.    Assessment/Narrative         The placement was negative for: blood aspirated, painful injection and site bleeding       Paresthesias: No.     Bolus given via. No blood aspirated via catheter.        Secured via.        Insertion/Infusion Method: Single Shot    Medication(s) Administered   Bupivacaine 0.25% PF (Infiltration), 20 mL  Bupivacaine liposome (Exparel) 1.3% LA inj susp (Infiltration), 20 mL

## 2021-07-12 NOTE — PROVIDER NOTIFICATION
07/12/21 0500   Provider Notification   Provider Name/Title Dr. Crum   Method of Notification Electronic Page   Request Evaluate-Remote     FYI sepsis alert was triggered, last temp 99.9. Trudi updated me on your plan for PP.

## 2021-07-12 NOTE — PROGRESS NOTES
"S: patient ready to begin pushing.   O: /57   Pulse 89   Temp 98.4  F (36.9  C) (Oral)   Resp 18   Ht 1.626 m (5' 4\")   Wt 93.9 kg (207 lb)   LMP 09/25/2020   SpO2 100%   Breastfeeding No   BMI 35.53 kg/m    A/P: CNM to remain bedside and will begin pushing.    Sneha Nelson CNM on 7/11/2021 at 8:54 PM      "

## 2021-07-12 NOTE — PLAN OF CARE
Care assumed 2330. Pushing upright with squat bar. Moved to inversions with bed in trendelenberg, sifting hips and Kary in child's pose. Pushed in hands and knees and then back to upright with squat bar. Sneha Nelson CNM at bedside.

## 2021-07-12 NOTE — PROGRESS NOTES
"CNM PROGRESS NOTE    SUBJECTIVE:  Requested MD evaluation for possible manual rotation of fetal head at 2145. CNM attempted rotation x2 with minimal rotation of head, but return to direct OP. Patient has been pushing in various positions for a little over 1 hour.     OBJECTIVE:  /56   Pulse 69   Temp 99.1  F (37.3  C) (Oral)   Resp 20   Ht 1.626 m (5' 4\")   Wt 93.9 kg (207 lb)   LMP 09/25/2020   SpO2 100%   Breastfeeding No   BMI 35.53 kg/m      Fetal heart tones: Baseline 145   Variability: moderate  Accelerations: present  Decelerations: absent    Contractions: Pt is guerita every 2-5 minutes, lasting 60-90 seconds and palpates moderate    Cervix: 10/ 100% / +1. Vtx to +2 with pushes. OP position verified by US and MD evaluation   ROM: clear fluid    Pitocin- 24 mu/min.  Antibiotics- none  Cervical ripening: N/A    ASSESSMENT:  IUP @ 41w2d active labor and IOL for oligohydramnios   Direct OP  GBS- negative     PLAN:   Darnell Crum and Alexa attempted manual rotation without success. Please see their note for details.   EFM reassuring so will plan to attempt side lying release x10 min on each side and sifting.  Will resume pushing efforts after position changes.      Sneha Nelson CNM      "

## 2021-07-12 NOTE — PROGRESS NOTES
"Blood pressure 118/56, pulse 69, temperature 99.1  F (37.3  C), temperature source Oral, resp. rate 20, height 1.626 m (5' 4\"), weight 93.9 kg (207 lb), last menstrual period 2020, SpO2 100 %, not currently breastfeeding.  General appearance: comfortable  Happy w epidural    CONTRACTIONS: Contractions every 3-4 minutes.  Palpate: moderate and strong  Pitocin- 24/mu,  Antibiotics- none  FETAL HEART TONES: baseline 145 with moderate FHR variability and  pos accelerations.  No decelerations present.        ROM: clear fluid  PELVIC EXAM:deferred      ASSESSMENT:  ==============  IUP @ 41w2d active labor   Fetal Heart rate tracing  category one  GBS- negative  IOL for oligo  Patient Active Problem List   Diagnosis     Indication for care in labor or delivery     Oligohydramnios in third trimester     BMI 31.0-31.9,adult     41 weeks gestation of pregnancy          PLAN:  ===========  comfort measures prn   Anticipate   Labor induction with Pitocin  reevaluate in 2-4 hours/PRN   Position changes  Tigist De Jesus, DELICIA FALCONM,CNM, APRN      "

## 2021-07-12 NOTE — PROGRESS NOTES
"Blood pressure 118/56, pulse 69, temperature 99.1  F (37.3  C), temperature source Oral, resp. rate 20, height 1.626 m (5' 4\"), weight 93.9 kg (207 lb), last menstrual period 09/25/2020, SpO2 100 %, not currently breastfeeding.  General appearance: uncomfortable with contractions  working    CONTRACTIONS: Contractions every 2-5 minutes.  Palpate: moderate  Pitocin- 22 mu/min.,  Antibiotics- none  FETAL HEART TONES: baseline 135 with moderate FHR variability and  pos accelerations. Rate late decelerations present.        ROM: clear fluid  PELVIC EXAM:PELVIC EXAM: 6/ 100%/ Mid/ soft/ 0         ASSESSMENT:  ==============  IUP @ 41w2d active labor, minimal/no progress and IOL for postdates   Fetal Heart rate tracing  category two  GBS- negative  No cx change x 3 hrs  Patient Active Problem List   Diagnosis     Indication for care in labor or delivery     Oligohydramnios in third trimester     BMI 31.0-31.9,adult     41 weeks gestation of pregnancy          PLAN:  ===========  comfort measures prn   Labor induction with Pitocin  Discussed no cx change   Encourage position changes, working with DELICIA DallasM,EZEKIELM, APRN      "

## 2021-07-12 NOTE — PHARMACY-VANCOMYCIN DOSING SERVICE
Pharmacy Vancomycin Initial Note  Date of Service 2021  Patient's  1990  30 year old, female    Indication: Triple I or Ch[place]    Current estimated CrCl = Estimated Creatinine Clearance: 110.8 mL/min (based on SCr of 0.81 mg/dL).    Creatinine for last 3 days  2021:  4:12 PM Creatinine 0.81 mg/dL    Recent Vancomycin Level(s) for last 3 days  No results found for requested labs within last 72 hours.      Vancomycin IV Administrations (past 72 hours)      No vancomycin orders with administrations in past 72 hours.                Nephrotoxins and other renal medications (From now, onward)    Start     Dose/Rate Route Frequency Ordered Stop    21 0230  ibuprofen (ADVIL/MOTRIN) tablet 800 mg      800 mg Oral EVERY 6 HOURS 21 0537      21 0830  ketorolac (TORADOL) injection 30 mg      30 mg Intravenous EVERY 6 HOURS 21 0537 21 0229    21 0830  vancomycin (VANCOCIN) 1,250 mg in sodium chloride 0.9 % 250 mL intermittent infusion      1,250 mg  over 90 Minutes Intravenous ONCE 21 0808      21 0700  gentamicin (GARAMYCIN) 470 mg in sodium chloride 0.9 % 50 mL intermittent infusion      5 mg/kg × 93.9 kg  over 60 Minutes Intravenous ONCE 21 0649            Contrast Orders - past 72 hours (72h ago, onward)    None                Plan:  1. Start vancomycin  1250 mg IV x1 per consult.   2. No monitoring is needed since the consult only requests one time dose of vancomycin.    Lucero Garcia [facility]

## 2021-07-12 NOTE — ANESTHESIA CARE TRANSFER NOTE
Patient: Kary Lund    Procedure(s):   SECTION    Diagnosis: * No pre-op diagnosis entered *  Diagnosis Additional Information: No value filed.    Anesthesia Type:   No value filed.     Note:    Oropharynx: spontaneously breathing  Level of Consciousness: awake  Oxygen Supplementation: room air    Independent Airway: airway patency satisfactory and stable  Dentition: dentition unchanged  Vital Signs Stable: post-procedure vital signs reviewed and stable  Report to RN Given: handoff report given  Patient transferred to: Labor and Delivery    Handoff Report: Identifed the Patient, Identified the Reponsible Provider, Reviewed the pertinent medical history, Discussed the surgical course, Reviewed Intra-OP anesthesia mangement and issues during anesthesia, Set expectations for post-procedure period and Allowed opportunity for questions and acknowledgement of understanding      Vitals: (Last set prior to Anesthesia Care Transfer)  CRNA VITALS  2021 0345 - 2021 0417      2021             Pulse:  130    SpO2:  96 %        Electronically Signed By: DELICIA Valentine CRNA  2021  4:17 AM

## 2021-07-12 NOTE — PROGRESS NOTES
"S: MD team in room to discuss r/b/a to VE vs c/s.   O: /55   Pulse 82   Temp 99.1  F (37.3  C) (Oral)   Resp 16   Ht 1.626 m (5' 4\")   Wt 93.9 kg (207 lb)   LMP 09/25/2020   SpO2 96%   Breastfeeding No   BMI 35.53 kg/m    A/P: After MDs assessment of station and pushing, they are recommending c/s. Patient would like a few minutes to process information and will then consent. Will transfer care to MD service at this time. Spoke with MDA and he is comfortable with  accompanying patient for prep in OR and procedure. CNM will remain out of delivery to minimize amount of people in OR. Patient and partner are comfortable with this decision.    Sneha Nelson CNM on 7/12/2021 at 2:12 AM    "

## 2021-07-12 NOTE — PLAN OF CARE
birth 0307, viable male . Vaginal hand required to bring fetal head out of pelvis. Meconium fluid. Uterine atony noted, boggy and somewhat edematous per MD. IV oxytocin and 800 mcg rectal misoprostol given in OR. Currently stable.

## 2021-07-12 NOTE — PLAN OF CARE
Report given and care relinquished to DAVID Simpson.    Antibiotics running. Tylenol was previously held to monitor fever, may consider giving now that antibiotic treatment has been started.    Left PIV occluded, pitocin and LR switched to R PIV before antibiotics were started. L PIV removed.    Order for ulloa to remain in 8 hours post procedure (1275). May remove at any time now.    Temperatures remain labile. Seema is feeling better, less shaking but still reporting feeling hot. Skin hot to the touch.    Bleeding scant, FF U/1.    Continue plan of care.

## 2021-07-12 NOTE — PLAN OF CARE
Data: Kary Lund transferred to [place] [address] via cart at 0645. Baby transferred via parent's arms.  Action: Receiving unit notified of transfer: Yes. Patient and family notified of room change. Report not yet given d/t nurse unavailability. Belongings sent to receiving unit. Accompanied by Registered Nurse. Oriented patient to surroundings. Call light within reach. ID bands double-checked with a  second RN. Band # [id number]. Gentamicin started.   Response: Patient tolerated transfer and is stable.

## 2021-07-12 NOTE — PROVIDER NOTIFICATION
07/12/21 0615   Vitals   /74   Temp 102.9  F (39.4  C)  (provider notified)   Pulse 82   Resp 16   SpO2 99 %   Oximeter Heart Rate 84 bpm     G3 resident notified: 0615 temp 102.9

## 2021-07-12 NOTE — PROGRESS NOTES
Patient arrived to [facility] unit via zoom cart at 0700,with belongings, accompanied by spouse/ significant other, with infant in arms. Received report from Aria ROMAN RN and checked bands. Unit and room orientation started. Call light within arms reach; no concerns present at this time. Continue with plan of care.

## 2021-07-12 NOTE — PLAN OF CARE
and TAPs block complete. Pt. Stable with no nausea or vomiting. QBL in  mL. Was able to do skin to skin and breastfeed in OR.  Transfer to OB PACU @ 0645.

## 2021-07-12 NOTE — CONSULTS
Brief Consult Note:    Patient is a 29yo  at 41w2d who is currently complete and pushing. MARY Carroll asked the OB team for a consult due to suspected OP position. On BSUS, baby is direct OP. Patient was counseled on manual internal rotation of the head to help transition to BESS. 4 attempts were made to manual rotate baby without success. Baby and patient tolerated these positions well. Patient will try side lying release at this time.     Seen with Dr. Alexa Crum MD  OB/GYN PGY-3  21 10:02 PM  I saw and evaluated the patient. I agree with the   findings and the plan of care as documented in the resident's note.  MD Alexia

## 2021-07-12 NOTE — PLAN OF CARE
VSS. Postpartum assessment WDL. Fundus firm, U/2, at midline, appropriately tender. Lochia scant. Incisional dressing clean, dry, intact. IV antibiotics infusing. Denies signs and symptoms of preeclampsia. Provided education on expected physiological changes after delivery. Offered Tylenol, Simethicone, and Senna but pt wants to wait until she's had breakfast. Spouse present, supportive with cares. Continue plan of care.

## 2021-07-12 NOTE — ANESTHESIA POSTPROCEDURE EVALUATION
Patient: Kary Lund    Procedure(s):   SECTION    Diagnosis:* No pre-op diagnosis entered *  Diagnosis Additional Information: No value filed.    Anesthesia Type:  Epidural    Note:     Postop Pain Control: Uneventful            Sign Out: Well controlled pain   PONV: No   Neuro/Psych: Uneventful            Sign Out: Acceptable/Baseline neuro status   Airway/Respiratory: Uneventful            Sign Out: Acceptable/Baseline resp. status   CV/Hemodynamics: Uneventful            Sign Out: Acceptable CV status; No obvious hypovolemia; No obvious fluid overload   Other NRE: NONE   DID A NON-ROUTINE EVENT OCCUR? No     Epidural-to- Updated ASA: 2      Last vitals:  Vitals:    21 2350 21 0044 21 0156   BP:   109/55   Pulse:      Resp: 16     Temp: 37.1  C (98.7  F) 37.4  C (99.4  F) 37.3  C (99.1  F)   SpO2:   96%       Last vitals prior to Anesthesia Care Transfer:  CRNA VITALS  2021 0345 - 2021 0418      2021             Pulse:  130    SpO2:  96 %          Electronically Signed By: Willie Price DO  2021  4:18 AM

## 2021-07-13 LAB
CREAT SERPL-MCNC: 0.86 MG/DL (ref 0.52–1.04)
GFR SERPL CREATININE-BSD FRML MDRD: 90 ML/MIN/1.73M2
HGB BLD-MCNC: 9.5 G/DL (ref 11.7–15.7)

## 2021-07-13 PROCEDURE — 36415 COLL VENOUS BLD VENIPUNCTURE: CPT | Performed by: STUDENT IN AN ORGANIZED HEALTH CARE EDUCATION/TRAINING PROGRAM

## 2021-07-13 PROCEDURE — 85018 HEMOGLOBIN: CPT | Performed by: STUDENT IN AN ORGANIZED HEALTH CARE EDUCATION/TRAINING PROGRAM

## 2021-07-13 PROCEDURE — 250N000011 HC RX IP 250 OP 636: Performed by: STUDENT IN AN ORGANIZED HEALTH CARE EDUCATION/TRAINING PROGRAM

## 2021-07-13 PROCEDURE — 250N000013 HC RX MED GY IP 250 OP 250 PS 637: Performed by: STUDENT IN AN ORGANIZED HEALTH CARE EDUCATION/TRAINING PROGRAM

## 2021-07-13 PROCEDURE — 120N000002 HC R&B MED SURG/OB UMMC

## 2021-07-13 PROCEDURE — 82565 ASSAY OF CREATININE: CPT | Performed by: STUDENT IN AN ORGANIZED HEALTH CARE EDUCATION/TRAINING PROGRAM

## 2021-07-13 RX ORDER — LANOLIN ALCOHOL/MO/W.PET/CERES
1000 CREAM (GRAM) TOPICAL DAILY
Qty: 90 TABLET | Refills: 3 | Status: SHIPPED | OUTPATIENT
Start: 2021-07-13 | End: 2021-07-29

## 2021-07-13 RX ORDER — MULTIVIT WITH MINERALS/LUTEIN
250 TABLET ORAL DAILY
Qty: 90 TABLET | Refills: 3 | Status: SHIPPED | OUTPATIENT
Start: 2021-07-13 | End: 2021-07-29

## 2021-07-13 RX ORDER — FERROUS SULFATE 325(65) MG
325 TABLET ORAL
Qty: 90 TABLET | Refills: 3 | Status: SHIPPED | OUTPATIENT
Start: 2021-07-13 | End: 2021-07-29

## 2021-07-13 RX ORDER — ACETAMINOPHEN AND CODEINE PHOSPHATE 120; 12 MG/5ML; MG/5ML
0.35 SOLUTION ORAL DAILY
Qty: 90 TABLET | Refills: 3 | Status: SHIPPED | OUTPATIENT
Start: 2021-07-13 | End: 2021-07-29

## 2021-07-13 RX ADMIN — ACETAMINOPHEN 975 MG: 325 TABLET, FILM COATED ORAL at 18:13

## 2021-07-13 RX ADMIN — ACETAMINOPHEN 975 MG: 325 TABLET, FILM COATED ORAL at 06:34

## 2021-07-13 RX ADMIN — IBUPROFEN 800 MG: 800 TABLET, FILM COATED ORAL at 21:53

## 2021-07-13 RX ADMIN — ENOXAPARIN SODIUM 40 MG: 40 INJECTION SUBCUTANEOUS at 18:13

## 2021-07-13 RX ADMIN — IBUPROFEN 800 MG: 800 TABLET, FILM COATED ORAL at 09:29

## 2021-07-13 RX ADMIN — DOCUSATE SODIUM AND SENNOSIDES 1 TABLET: 8.6; 5 TABLET ORAL at 09:29

## 2021-07-13 RX ADMIN — DOCUSATE SODIUM AND SENNOSIDES 2 TABLET: 8.6; 5 TABLET ORAL at 21:53

## 2021-07-13 RX ADMIN — ACETAMINOPHEN 975 MG: 325 TABLET, FILM COATED ORAL at 12:22

## 2021-07-13 RX ADMIN — IBUPROFEN 800 MG: 800 TABLET, FILM COATED ORAL at 02:26

## 2021-07-13 RX ADMIN — IBUPROFEN 800 MG: 800 TABLET, FILM COATED ORAL at 16:00

## 2021-07-13 NOTE — PLAN OF CARE
VSS. Pt has remained afebrile. Assessment WNL. Incision SONIA with scant dried drainage on strips.   Voiding independently w/o difficulty.   Pain well managed with PO medications see MAR.   BF with assistance for deeper latch and positioning. Mother reports nipple tenderness.   EDS is pending completion. And  parents are working on birth certificate.   PIV SL left in until discharge to home.   Plan is to continue to monitor overnight and discharge tomorrow.

## 2021-07-13 NOTE — PROGRESS NOTES
Post Partum Progress Note    Subjective:  Patient is doing well, pain well controlled. Tolerating PO, ambulating without any issues, voiding spontaneously, passing flatus, lochia within normal limits. Denies any fever, chills, SOB, chest pain, N/V,  headache, dizziness. Planning on breastfeeding. Would like POP for birth control.     Objective:  Patient Vitals for the past 24 hrs:   BP Temp Temp src Pulse Resp SpO2   21 0030 110/58 -- -- 76 18 99 %   21 2000 115/70 97.9  F (36.6  C) Oral 84 18 100 %   21 1559 124/65 98.8  F (37.1  C) Oral 92 16 98 %   21 1247 110/71 98.8  F (37.1  C) Oral -- -- 98 %   21 1100 110/64 98.9  F (37.2  C) Oral 117 18 98 %   21 1000 113/62 -- -- 97 18 98 %   21 0930 117/64 -- -- 100 18 99 %   21 0900 120/59 -- -- 90 16 97 %   21 0830 110/65 -- -- 107 18 96 %   21 0820 114/62 99.5  F (37.5  C) Oral 112 18 97 %   21 0705 118/64 100.3  F (37.9  C) Oral 94 18 99 %       General: NAD, resting comfortably  Resp: Normal rate and effort on room air  Cv: well perfused  Abd:  Soft, nontender, nondistended, fundus firm below the umbilicus  Incision: non-erythematous, non-indurated, no drainage  Ext:  No edema in bilateral LE    HGB  Recent Labs   Lab 21  1625   HGB 11.9       Assessment and Plan:  30 year old old  POD#1 s/p PLTCS for arrest of descent. Doing well, starting to meet post-op goals.    # Postpartum Care  - Pain: Tylenol, ibuprofen, oxycodone PRN.  - Heme: QBL 798mL > AM Hemoglobin pending. Asymptomatic from blood loss anemia; will discharge with oral iron if <10.   - GI: Regular diet. Bowel regimen. Antiemetics PRN. Tolerating PO  - : voiding spontaneously  - Rh positive, Rubella immune  - breastfeeding  - POP for birth control  - PPx: Encourage ambulation, IS, SCDs while confined to bed    # Triple I  - s/p 1 dose of vanc/gent given allergy to clinda and amoxicillin   - Tmax/last- 102.9 (615). Has  been afebrile for 24 hours now  - continue monitoring for signs/symptoms of infection    Anticipate discharge to home POD#3    Sheldon Crum MD  OB/GYN PGY-3  07/13/21 6:42 AM      Appreciate Dr. Crum's note above, patient also seen and examined by me. I agree with the note above.   Serina Perales MD

## 2021-07-13 NOTE — PROGRESS NOTES
"Post  Anesthesia Follow Up Note    Patient: Kary Lund    Patient location: Postpartum floor.      Procedure(s) Performed:  Procedure(s):   SECTION    Anesthesia type: Epidural    Subjective:     Pain is well controlled     Additional ROS:  She does not complain of pruritis at this time. She denies weakness, denies paresthesia, denies difficulties breathing or voiding, denies nausea or vomiting. She is able to ambulate and currently tolerates regular diet.    Objective:  Vitals stable      Last Vitals: /83   Pulse 76   Temp 36.6  C (97.8  F) (Oral)   Resp 18   Ht 1.626 m (5' 4\")   Wt 93.9 kg (207 lb)   LMP 2020   SpO2 99%   Breastfeeding Unknown   BMI 35.53 kg/m      Assessment and plan:   Kary Lund is a 30 year old female  POD #1 s/p   and single shot TAP nerve block injections. There is no evidence of adverse side effects associated with spinal and nerve block injections.    Pain is well controlled.    Thank you for including us in the care for this patient.    Zurdo Gordillo MD  CA-1  Anesthesia        "

## 2021-07-13 NOTE — PLAN OF CARE
VSS. Pain managed with tylenol and toradol. Alford removed at 1600 due to void at 2000. Breastfeeding.

## 2021-07-13 NOTE — PLAN OF CARE
Data: Vital signs within normal limits. Postpartum checks within normal limits - see flow record. Patient eating and drinking normally. Patient able to empty bladder independently and is up ambulating. No apparent signs of infection. Incision covered with dressing. Clean, dry, intact. Patient performing self cares and is able to care for infant.  Action: Patient medicated during the shift for pain with Tylenol and Ibuprofen. Toradol transitioned to oral Ibuprofen. See MAR. Patient reassessed within 1 hour after each medication and pain was improved - patient stated she was comfortable. Patient education done about pain management and activity. Encouraged to empty bladder frequently. Discussed nipple cream use for tender nipples. See flow record.  Response: Positive attachment behaviors observed with infant. Support persons, Robert, present at bedside and attentive.   Plan: Anticipate discharge on 7/15/21. Continue POC.

## 2021-07-14 VITALS
DIASTOLIC BLOOD PRESSURE: 67 MMHG | BODY MASS INDEX: 35.34 KG/M2 | OXYGEN SATURATION: 99 % | HEIGHT: 64 IN | RESPIRATION RATE: 18 BRPM | HEART RATE: 66 BPM | TEMPERATURE: 98.5 F | SYSTOLIC BLOOD PRESSURE: 109 MMHG | WEIGHT: 207 LBS

## 2021-07-14 PROCEDURE — 250N000013 HC RX MED GY IP 250 OP 250 PS 637: Performed by: STUDENT IN AN ORGANIZED HEALTH CARE EDUCATION/TRAINING PROGRAM

## 2021-07-14 RX ORDER — OXYCODONE HYDROCHLORIDE 5 MG/1
5 TABLET ORAL EVERY 6 HOURS PRN
Qty: 6 TABLET | Refills: 0 | Status: CANCELLED | OUTPATIENT
Start: 2021-07-14 | End: 2021-07-17

## 2021-07-14 RX ADMIN — IBUPROFEN 800 MG: 800 TABLET, FILM COATED ORAL at 04:19

## 2021-07-14 RX ADMIN — IBUPROFEN 800 MG: 800 TABLET, FILM COATED ORAL at 10:10

## 2021-07-14 RX ADMIN — ACETAMINOPHEN 975 MG: 325 TABLET, FILM COATED ORAL at 06:27

## 2021-07-14 RX ADMIN — ACETAMINOPHEN 975 MG: 325 TABLET, FILM COATED ORAL at 00:29

## 2021-07-14 NOTE — PROGRESS NOTES
Post Partum Progress Note    Subjective:  Patient is doing well, pain well controlled. Tolerating PO, ambulating without any issues, voiding spontaneously, passing flatus, lochia within normal limits. Denies any fever, chills, SOB, chest pain, N/V,  headache, dizziness. Planning on breastfeeding. Would like POP for birth control.     Objective:  Patient Vitals for the past 24 hrs:   BP Temp Temp src Pulse Resp   21 0017 103/66 98.6  F (37  C) Oral 71 18   21 1558 109/64 98.9  F (37.2  C) Oral 79 18   21 0931 118/83 97.8  F (36.6  C) Oral -- 18       General: NAD, resting comfortably  Resp: Normal rate and effort on room air  Cv: well perfused  Abd:  Soft, nontender, nondistended, fundus firm below the umbilicus  Incision: non-erythematous, non-indurated, no drainage  Ext:  No edema in bilateral LE    HGB  Recent Labs   Lab 21  0632 21  1625   HGB 9.5* 11.9       Assessment and Plan:  30 year old old  POD#2 s/p PLTCS for arrest of descent. Doing well, meeting discharge goals.    # Postpartum Care  - Pain: Tylenol, ibuprofen, oxycodone PRN.  - Heme: QBL 798mL. Hemoglobin stable after blood loss. Asymptomatic from blood loss anemia; will discharge with oral iron  - GI: Regular diet. Bowel regimen. Antiemetics PRN. Tolerating PO  - : voiding spontaneously  - Rh positive, Rubella immune  - breastfeeding  - POP for birth control  - PPx: Encourage ambulation, IS, SCDs while confined to bed    # Triple I  - s/p 1 dose of vanc/gent given allergy to clinda and amoxicillin   - Tmax/last- 102.9 ( 0615). Has been afebrile for >24 hours now  - continue monitoring for signs/symptoms of infection    Anticipate discharge to home today    Sheldon Crum MD  OB/GYN PGY-3  21 6:34 AM    [facility] staff:  Appreciate note by Dr. Crum.  I have seen and examined the patient without the resident. I have reviewed, edited, and agree with the note.        Faina Pacheco,  MD, FACOG  7/14/2021  8:21 AM

## 2021-07-14 NOTE — PLAN OF CARE
Pt. Discharged. VSS at this time. Post partum assessment WDL. Pt. Was wheel chaired down to the car accompanied by  and baby. Went over discharge instructions with parents and had no further questions at this time.

## 2021-07-14 NOTE — LACTATION NOTE
This note was copied from a baby's chart.  Follow up visit, mom asking about frequent cues, cluster feeing and how to tell if getting milk while feeding. Weight loss 5.6% at 24 hours of age, serum bilirubin low risk level.     Infant sleeping in mom's arms on arrival, had just fed an hour earlier now occasionally moving lips then bring hand to mouth. Reviewed feeding cues, rooting reflex expected any time his cheeks, face are touched. Mom brought him to breast, latched independently then flange lower lip to get deeper, comfortable latch. He was sleepy, had all non nutritive sucking (rarely deep jaw pull and only one swallow heard in 5 minutes). He spontaneously pulled off after about 7 minutes and fell asleep at the breast, nipple mostly rounded after. Reviewed how to tell if transferring milk at breast, how to tell if getting enough, normal feeding routines in early days, postpartum book breastfeeding chapter, breastfeeding log and who, when to call if not meeting goals, [facility] pump cleaning handout and lactation resources inpatient and outpatient. They plan follow up at [facility], orient to LC's available there and encouraged outpatient appointment within first week after discharge.

## 2021-07-14 NOTE — PLAN OF CARE
VSS and postpartum assessments WDL.  Up ad parveen with steady gait.  Independent with cares.  Bonding well with infant, Bulmaro.  Breastfeeding on cue independently, but sometimes with mild assistance.  Pain managed with tylenol and ibuprofen.  PIV in hand removed, per pt request.   Robert is present and very supportive.  Will continue with postpartum cares and education per plan of care.

## 2021-07-14 NOTE — PLAN OF CARE
VSS at this time. Post partum assessment WDL. Incision is open to air- with steri strips. Is clean dry and intact. Fundus is firm 2 below the umbilicus. Voiding well without difficulty. Eds was a 3. Birth certificate is completed. IV out. Pt. Ready for discharge.

## 2021-07-28 NOTE — PROGRESS NOTES
Subjective:  Kary Lund is a 30 year old  who delivered by primary  on 2021 who presents to clinic for a routine incision check at 2 weeks postpartum.     Pre-op Diagnosis:           - Intrauterine pregnancy at 41w3d  - arrest of descent  - oligohydramnios  - BPP of   - obesity    She delivered a baby boy.  EBL: 798  Hemoglobin   Date Value Ref Range Status   2021 9.5 (L) 11.7 - 15.7 g/dL Final   (after delivery)    Her hospital stay was complicated by Triple I; she was treated with antibiotics.     Today Kary has no concerns. She is feeling well. Mild discomfort on right side of incision, but no longer needing to take any medications for pain.  Denies drainage or erythema. Constipation is improved with stool softener. Denies nausea/ vomiting. Denies headache, dizziness, lightheadedness, and SOB. Vaginal bleeding has slowed to light pink tinged discharge. Did feel some vaginal pressure last night, but this has resolved. She denies fever.  Denies abdominal or pelvic pain.     Breastfeeding is going very well; baby is latching in all positions and eating well. Pt denies any pain or cracking, bleeding of nipples.     Psych: Kary started seeing a therapist postpartum to process her labor and birth experience that was different than her desires.  Plans to continue weekly therapy.  has been very supportive. Other family members have been taking turns helping the patient - providing meals, breaks, etc. Getting 2.5 hr stretches of sleep overnight and her  takes the baby in the morning so that Kary can get a little bit more sleep.      PHQ 2021   PHQ-9 Total Score 3   Q9: Thoughts of better off dead/self-harm past 2 weeks Not at all     JAE-7 SCORE 2021   Total Score 3     Objective:  /73   Pulse 91   Wt 83.5 kg (184 lb)   LMP 2020   Breastfeeding Yes   BMI 31.58 kg/m    General: pleasant female in no acute distress  Psych: normal  mentation, well oriented  Respiratory:  Unlabored breathing  Abdomen: non-tender; steri-strips in place; these were removed and incision noted to be approximated without erythema or drainage. Healing well.     Assessment:  Encounter Diagnosis   Name Primary?     Postoperative state Yes     Plan:  Incision appears to be healing well; counseled pt that it is not uncommon to have continued mild, intermittent pain/ discomfort on one side of the incision at this time. May continue OTC pain medication, PRN.   Encouraged patient to continue psychotherapy at this time.   Patient had not started iron postpartum; she is feeling well today without any symptoms of anemia. Pt prefers to continue prenatal vitamin with iron and increase iron rich foods rather than start iron supplement today.   Plans POP for contraception.  Return to clinic for 6 week postpartum appointment or sooner PRN.    Kary expressed understanding and agreement with the plan for care.    A total of 35 minutes was spent in chart review, patient care, and documentation.      Tequila Navarro, DNP, APRN, WHNP

## 2021-07-29 ENCOUNTER — OFFICE VISIT (OUTPATIENT)
Dept: OBGYN | Facility: CLINIC | Age: 31
End: 2021-07-29
Attending: NURSE PRACTITIONER
Payer: COMMERCIAL

## 2021-07-29 VITALS
HEART RATE: 91 BPM | WEIGHT: 184 LBS | BODY MASS INDEX: 31.58 KG/M2 | DIASTOLIC BLOOD PRESSURE: 73 MMHG | SYSTOLIC BLOOD PRESSURE: 112 MMHG

## 2021-07-29 DIAGNOSIS — Z98.890 POSTOPERATIVE STATE: Primary | ICD-10-CM

## 2021-07-29 PROCEDURE — 99207 PR POST PARTUM EXAM: CPT | Performed by: NURSE PRACTITIONER

## 2021-07-29 PROCEDURE — G0463 HOSPITAL OUTPT CLINIC VISIT: HCPCS

## 2021-07-29 RX ORDER — ASPIRIN 81 MG/1
TABLET, CHEWABLE ORAL
COMMUNITY
Start: 2021-07-21 | End: 2021-08-30

## 2021-07-29 RX ORDER — PRENATAL VIT/IRON FUM/FOLIC AC 27MG-0.8MG
1 TABLET ORAL DAILY
COMMUNITY

## 2021-07-29 ASSESSMENT — ANXIETY QUESTIONNAIRES
5. BEING SO RESTLESS THAT IT IS HARD TO SIT STILL: NOT AT ALL
IF YOU CHECKED OFF ANY PROBLEMS ON THIS QUESTIONNAIRE, HOW DIFFICULT HAVE THESE PROBLEMS MADE IT FOR YOU TO DO YOUR WORK, TAKE CARE OF THINGS AT HOME, OR GET ALONG WITH OTHER PEOPLE: NOT DIFFICULT AT ALL
7. FEELING AFRAID AS IF SOMETHING AWFUL MIGHT HAPPEN: NOT AT ALL
6. BECOMING EASILY ANNOYED OR IRRITABLE: NOT AT ALL
2. NOT BEING ABLE TO STOP OR CONTROL WORRYING: NOT AT ALL
3. WORRYING TOO MUCH ABOUT DIFFERENT THINGS: SEVERAL DAYS
GAD7 TOTAL SCORE: 3
1. FEELING NERVOUS, ANXIOUS, OR ON EDGE: SEVERAL DAYS

## 2021-07-29 ASSESSMENT — PATIENT HEALTH QUESTIONNAIRE - PHQ9
5. POOR APPETITE OR OVEREATING: SEVERAL DAYS
SUM OF ALL RESPONSES TO PHQ QUESTIONS 1-9: 3

## 2021-07-29 NOTE — LETTER
Date:August 14, 2021      Patient was self referred, no letter generated. Do not send.        [facility] Health Information

## 2021-07-29 NOTE — LETTER
2021       RE: Kary Lund  [address] [place] [address]     Dear Colleague,    Thank you for referring your patient, Kary Lund, to the [facility] [place] at [facility]. Please see a copy of my visit note below.    Subjective:  Kary Lund is a 30 year old  who delivered by primary  on 2021 who presents to clinic for a routine incision check at 2 weeks postpartum.     Pre-op Diagnosis:           - Intrauterine pregnancy at 41w3d  - arrest of descent  - oligohydramnios  - BPP of   - obesity    She delivered a baby boy.  EBL: 798  Hemoglobin   Date Value Ref Range Status   2021 9.5 (L) 11.7 - 15.7 g/dL Final   (after delivery)    Her hospital stay was complicated by Triple I; she was treated with antibiotics.     Today Kary has no concerns. She is feeling well. Mild discomfort on right side of incision, but no longer needing to take any medications for pain.  Denies drainage or erythema. Constipation is improved with stool softener. Denies nausea/ vomiting. Denies headache, dizziness, lightheadedness, and SOB. Vaginal bleeding has slowed to light pink tinged discharge. Did feel some vaginal pressure last night, but this has resolved. She denies fever.  Denies abdominal or pelvic pain.     Breastfeeding is going very well; baby is latching in all positions and eating well. Pt denies any pain or cracking, bleeding of nipples.     Psych: Kary started seeing a therapist postpartum to process her labor and birth experience that was different than her desires.  Plans to continue weekly therapy.  has been very supportive. Other family members have been taking turns helping the patient - providing meals, breaks, etc. Getting 2.5 hr stretches of sleep overnight and her  takes the baby in the morning so that Kary can get a little bit more sleep.      PHQ 2021   PHQ-9  Total Score 3   Q9: Thoughts of better off dead/self-harm past 2 weeks Not at all     JAE-7 SCORE 7/29/2021   Total Score 3     Objective:  /73   Pulse 91   Wt 83.5 kg (184 lb)   LMP 09/25/2020   Breastfeeding Yes   BMI 31.58 kg/m    General: pleasant female in no acute distress  Psych: normal mentation, well oriented  Respiratory:  Unlabored breathing  Abdomen: non-tender; steri-strips in place; these were removed and incision noted to be approximated without erythema or drainage. Healing well.     Assessment:  Encounter Diagnosis   Name Primary?     Postoperative state Yes     Plan:  Incision appears to be healing well; counseled pt that it is not uncommon to have continued mild, intermittent pain/ discomfort on one side of the incision at this time. May continue OTC pain medication, PRN.   Encouraged patient to continue psychotherapy at this time.   Patient had not started iron postpartum; she is feeling well today without any symptoms of anemia. Pt prefers to continue prenatal vitamin with iron and increase iron rich foods rather than start iron supplement today.   Plans POP for contraception.  Return to clinic for 6 week postpartum appointment or sooner PRN.    Kary expressed understanding and agreement with the plan for care.    A total of 35 minutes was spent in chart review, patient care, and documentation.      Tequila Navarro, ADRIANE, APRN, WHNP        Again, thank you for allowing me to participate in the care of your patient.      Sincerely,    Tequila Navarro, DELICIA CNP

## 2021-07-29 NOTE — NURSING NOTE
Chief Complaint   Patient presents with     RECHECK     2 week f/upost partum check  2021   Khushboo Link LPN

## 2021-07-30 ASSESSMENT — ANXIETY QUESTIONNAIRES: GAD7 TOTAL SCORE: 3

## 2021-08-15 ENCOUNTER — HEALTH MAINTENANCE LETTER (OUTPATIENT)
Age: 31
End: 2021-08-15

## 2021-08-30 ENCOUNTER — OFFICE VISIT (OUTPATIENT)
Dept: OBGYN | Facility: CLINIC | Age: 31
End: 2021-08-30
Attending: ADVANCED PRACTICE MIDWIFE
Payer: COMMERCIAL

## 2021-08-30 VITALS
SYSTOLIC BLOOD PRESSURE: 109 MMHG | HEART RATE: 76 BPM | HEIGHT: 64 IN | WEIGHT: 187 LBS | DIASTOLIC BLOOD PRESSURE: 70 MMHG | BODY MASS INDEX: 31.92 KG/M2

## 2021-08-30 DIAGNOSIS — Z98.891 S/P PRIMARY LOW TRANSVERSE C-SECTION: ICD-10-CM

## 2021-08-30 PROCEDURE — G0463 HOSPITAL OUTPT CLINIC VISIT: HCPCS

## 2021-08-30 ASSESSMENT — MIFFLIN-ST. JEOR: SCORE: 1538.48

## 2021-08-30 ASSESSMENT — PATIENT HEALTH QUESTIONNAIRE - PHQ9
5. POOR APPETITE OR OVEREATING: SEVERAL DAYS
SUM OF ALL RESPONSES TO PHQ QUESTIONS 1-9: 2

## 2021-08-30 ASSESSMENT — ANXIETY QUESTIONNAIRES
7. FEELING AFRAID AS IF SOMETHING AWFUL MIGHT HAPPEN: NOT AT ALL
5. BEING SO RESTLESS THAT IT IS HARD TO SIT STILL: NOT AT ALL
2. NOT BEING ABLE TO STOP OR CONTROL WORRYING: NOT AT ALL
6. BECOMING EASILY ANNOYED OR IRRITABLE: SEVERAL DAYS
3. WORRYING TOO MUCH ABOUT DIFFERENT THINGS: SEVERAL DAYS
GAD7 TOTAL SCORE: 3
1. FEELING NERVOUS, ANXIOUS, OR ON EDGE: NOT AT ALL

## 2021-08-30 ASSESSMENT — PAIN SCALES - GENERAL: PAINLEVEL: NO PAIN (0)

## 2021-08-30 NOTE — LETTER
"2021       RE: Kary Lund  [address] [place] [address]     Dear Colleague,    Thank you for referring your patient, Kary Lund, to the [facility] [place] at [facility]. Please see a copy of my visit note below.    Nursing Notes:   Uzma Bah LPN  2021  3:33 PM  Signed  SUBJECTIVE:   Kary Lund is here for her 6-week postpartum checkup.     PHQ-9 score:   Hx of Abuse:  No    Delivery Date: 21.    Delivering provider:  Serina Liu MD.    Type of delivery:  .     Delivery complications: stuck on OP  Infant gender:  Boy Bulmaro, weight 7 pounds 14 oz.  Feeding Method:   and Bottlefed.  Complications reported with feeding:  none, infant thriving .    Bleeding:  None.  Duration:  3-4 weeks.  Menses resumed:  No  Bowel/Urinary problems:  Yes  constipation    Contraception Planned:  want to talk about  She  has not had intercourse since delivery..     starting to see therapist more frequently,, protective give big life changes!  Pap not due today-will f/u w [facility] CNMs or Dr. Sandhu   Found out  is a sickle cell carrier, was not clear if she is a carrier or not.  Unable to access [facility] records today.    ================================================================  ROS: 10 point ROS neg other than the symptoms noted above in the HPI.     PHQ 2021   PHQ-9 Total Score 3 2   Q9: Thoughts of better off dead/self-harm past 2 weeks Not at all Not at all     JAE-7 SCORE 2021   Total Score 3 3     EXAM:  /70   Pulse 76   Ht 1.626 m (5' 4.02\")   Wt 84.8 kg (187 lb)   LMP 2020   BMI 32.08 kg/m      General: healthy, alert and no distress  Psych: negative for anxiety, nervous breakdown, depression, thoughts of self-harm and thoughts of hurting someone else  Last PHQ-9 score on record= No Value exists for the : [facility]#PHQ9  Breasts:  Lactating, " Nipples intact with no lesions, Non-tender and No S/S of yeast or mastitis  Abdomen: Benign, Soft, flat, non-tender, No masses, organomegaly and Diastasis less than 1-2 FB  Incision:  well healed and dry and intact         ASSESSMENT:   Encounter Diagnoses   Name Primary?     S/P primary low transverse       Routine postpartum follow-up Yes      Normal postpartum exam after c/s for obstetrical complications  Pregnancy was complicated by:  none.      PLAN:  Orders Placed This Encounter   Procedures     Hemoglobin POCT      No orders of the defined types were placed in this encounter.       Risks and benefits of prescribed medications discussed.  Medication instructions reviewed.  Contraception methods discussed-pt may decide to use Mirena IUD, will go to [facility] providers in the next week or so.  Encouraged to discussed sickle cell carrier status  Signs and symptoms of postpartum depression/anxiety discussed and resources offered  Discussed calcium intake, vitamins and supplements including Vitamin D  Exercise encouraged  Flu shot recommended  Follow up in 1 year  Tigist De Jesus, DNP, APRN, CNM, FACNM

## 2021-08-30 NOTE — PROGRESS NOTES
"Nursing Notes:   OlvinUzma Wisdom, LPN  2021  3:33 PM  Signed  SUBJECTIVE:   Kary Lund is here for her 6-week postpartum checkup.     PHQ-9 score:   Hx of Abuse:  No    Delivery Date: 21.    Delivering provider:  Serina Liu MD.    Type of delivery:  .     Delivery complications: stuck on OP  Infant gender:  Boy Bulmaro, weight 7 pounds 14 oz.  Feeding Method:   and Bottlefed.  Complications reported with feeding:  none, infant thriving .    Bleeding:  None.  Duration:  3-4 weeks.  Menses resumed:  No  Bowel/Urinary problems:  Yes  constipation    Contraception Planned:  want to talk about  She  has not had intercourse since delivery..     starting to see therapist more frequently,, protective give big life changes!  Pap not due today-will f/u w [facility] CN[facility] or Dr. Sandhu   Found out  is a sickle cell carrier, was not clear if she is a carrier or not.  Unable to access [facility] records today.    ================================================================  ROS: 10 point ROS neg other than the symptoms noted above in the HPI.     PHQ 2021   PHQ-9 Total Score 3 2   Q9: Thoughts of better off dead/self-harm past 2 weeks Not at all Not at all     JAE-7 SCORE 2021   Total Score 3 3     EXAM:  /70   Pulse 76   Ht 1.626 m (5' 4.02\")   Wt 84.8 kg (187 lb)   LMP 2020   BMI 32.08 kg/m      General: healthy, alert and no distress  Psych: negative for anxiety, nervous breakdown, depression, thoughts of self-harm and thoughts of hurting someone else  Last PHQ-9 score on record= No Value exists for the : [facility]#PHQ9  Breasts:  Lactating, Nipples intact with no lesions, Non-tender and No S/S of yeast or mastitis  Abdomen: Benign, Soft, flat, non-tender, No masses, organomegaly and Diastasis less than 1-2 FB  Incision:  well healed and dry and intact         ASSESSMENT:   Encounter Diagnoses   Name Primary?     S/P primary low transverse   "     Routine postpartum follow-up Yes      Normal postpartum exam after c/s for obstetrical complications  Pregnancy was complicated by:  none.      PLAN:  Orders Placed This Encounter   Procedures     Hemoglobin POCT      No orders of the defined types were placed in this encounter.       Risks and benefits of prescribed medications discussed.  Medication instructions reviewed.  Contraception methods discussed-pt may decide to use Mirena IUD, will go to [facility] providers in the next week or so.  Encouraged to discussed sickle cell carrier status  Signs and symptoms of postpartum depression/anxiety discussed and resources offered  Discussed calcium intake, vitamins and supplements including Vitamin D  Exercise encouraged  Flu shot recommended  Follow up in 1 year  Tigist De Jesus, DNP, APRN, CNM, FACNM

## 2021-08-30 NOTE — NURSING NOTE
SUBJECTIVE:   Kary Lund is here for her 6-week postpartum checkup.     PHQ-9 score:   Hx of Abuse:  No    Delivery Date: 21.    Delivering provider:  Serina Liu MD.    Type of delivery:  .     Delivery complications: stuck on OP  Infant gender:  Boy Bulmaro, weight 7 pounds 14 oz.  Feeding Method:   and Bottlefed.  Complications reported with feeding:  none, infant thriving .    Bleeding:  None.  Duration:  3-4 weeks.  Menses resumed:  No  Bowel/Urinary problems:  Yes  constipation    Contraception Planned:  want to talk about  She  has not had intercourse since delivery..

## 2021-08-31 ASSESSMENT — ANXIETY QUESTIONNAIRES: GAD7 TOTAL SCORE: 3

## 2021-10-10 ENCOUNTER — HEALTH MAINTENANCE LETTER (OUTPATIENT)
Age: 31
End: 2021-10-10

## 2022-09-18 ENCOUNTER — HEALTH MAINTENANCE LETTER (OUTPATIENT)
Age: 32
End: 2022-09-18

## 2023-10-08 ENCOUNTER — HEALTH MAINTENANCE LETTER (OUTPATIENT)
Age: 33
End: 2023-10-08

## (undated) DEVICE — SOL NACL 0.9% IRRIG 1000ML BOTTLE 07138-09

## (undated) DEVICE — SU MONOCRYL 0 CTB-1 36" YB946

## (undated) DEVICE — ESU GROUND PAD UNIVERSAL W/O CORD

## (undated) DEVICE — GLOVE ESTEEM POWDER FREE SMT 6.5  2D72PT65

## (undated) DEVICE — CATH TRAY FOLEY 16FR BARDEX W/DRAIN BAG STATLOCK 300316A

## (undated) DEVICE — SOL WATER IRRIG 1000ML BOTTLE 07139-09

## (undated) DEVICE — PREP CHLORAPREP 26ML TINTED ORANGE  260815

## (undated) DEVICE — PACK C-SECTION LF PL15OTA83B

## (undated) DEVICE — SOL ADH LIQUID BENZOIN SWAB 0.6ML C1544

## (undated) DEVICE — SU VICRYL 0 CT-1 36" J346H

## (undated) DEVICE — STRAP KNEE/BODY 31143004

## (undated) DEVICE — BNDG ABDOMINAL BINDER 10X26-50" 08140145

## (undated) DEVICE — STOCKING SLEEVE COMPRESSION CALF LG

## (undated) DEVICE — SU VICRYL 4-0 PS-2 18" UND J496G

## (undated) DEVICE — DRSG STERI STRIP 1/2X4" R1547

## (undated) DEVICE — DRSG TELFA ISLAND 4X10"

## (undated) RX ORDER — KETOROLAC TROMETHAMINE 30 MG/ML
INJECTION, SOLUTION INTRAMUSCULAR; INTRAVENOUS
Status: DISPENSED
Start: 2021-07-12

## (undated) RX ORDER — FENTANYL CITRATE 50 UG/ML
INJECTION, SOLUTION INTRAMUSCULAR; INTRAVENOUS
Status: DISPENSED
Start: 2021-07-12

## (undated) RX ORDER — OXYTOCIN/0.9 % SODIUM CHLORIDE 30/500 ML
PLASTIC BAG, INJECTION (ML) INTRAVENOUS
Status: DISPENSED
Start: 2021-07-12

## (undated) RX ORDER — LIDOCAINE HCL/EPINEPHRINE/PF 2%-1:200K
VIAL (ML) INJECTION
Status: DISPENSED
Start: 2021-07-12

## (undated) RX ORDER — FENTANYL CITRATE-0.9 % NACL/PF 10 MCG/ML
PLASTIC BAG, INJECTION (ML) INTRAVENOUS
Status: DISPENSED
Start: 2021-07-12